# Patient Record
Sex: MALE | Race: BLACK OR AFRICAN AMERICAN | NOT HISPANIC OR LATINO | URBAN - METROPOLITAN AREA
[De-identification: names, ages, dates, MRNs, and addresses within clinical notes are randomized per-mention and may not be internally consistent; named-entity substitution may affect disease eponyms.]

---

## 2017-07-29 ENCOUNTER — EMERGENCY (EMERGENCY)
Facility: HOSPITAL | Age: 52
LOS: 1 days | Discharge: HOME | End: 2017-07-29
Attending: EMERGENCY MEDICINE | Admitting: EMERGENCY MEDICINE
Payer: COMMERCIAL

## 2017-07-29 VITALS
SYSTOLIC BLOOD PRESSURE: 143 MMHG | HEIGHT: 70 IN | HEART RATE: 96 BPM | OXYGEN SATURATION: 100 % | TEMPERATURE: 208 F | WEIGHT: 229.94 LBS | DIASTOLIC BLOOD PRESSURE: 90 MMHG | RESPIRATION RATE: 20 BRPM

## 2017-07-29 PROCEDURE — 99284 EMERGENCY DEPT VISIT MOD MDM: CPT | Mod: 25

## 2017-07-29 PROCEDURE — 99053 MED SERV 10PM-8AM 24 HR FAC: CPT
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
## 2017-07-30 PROCEDURE — 96372 THER/PROPH/DIAG INJ SC/IM: CPT

## 2017-07-30 PROCEDURE — 72125 CT NECK SPINE W/O DYE: CPT | Mod: 26

## 2017-07-30 PROCEDURE — 99284 EMERGENCY DEPT VISIT MOD MDM: CPT | Mod: 25

## 2017-07-30 PROCEDURE — 73030 X-RAY EXAM OF SHOULDER: CPT | Mod: 26,RT

## 2017-07-30 PROCEDURE — 73030 X-RAY EXAM OF SHOULDER: CPT

## 2017-07-30 PROCEDURE — 70450 CT HEAD/BRAIN W/O DYE: CPT | Mod: 26

## 2017-07-30 PROCEDURE — 70450 CT HEAD/BRAIN W/O DYE: CPT

## 2017-07-30 PROCEDURE — 72125 CT NECK SPINE W/O DYE: CPT

## 2017-07-30 RX ORDER — TAMSULOSIN HYDROCHLORIDE 0.4 MG/1
0 CAPSULE ORAL
Qty: 0 | Refills: 0 | COMMUNITY

## 2017-07-30 RX ORDER — AMLODIPINE BESYLATE 2.5 MG/1
0 TABLET ORAL
Qty: 0 | Refills: 0 | COMMUNITY

## 2017-07-30 RX ORDER — IBUPROFEN 200 MG
1 TABLET ORAL
Qty: 9 | Refills: 0 | OUTPATIENT
Start: 2017-07-30 | End: 2017-08-02

## 2017-07-30 RX ORDER — LEVOTHYROXINE SODIUM 125 MCG
0 TABLET ORAL
Qty: 0 | Refills: 0 | COMMUNITY

## 2017-07-30 RX ORDER — CYCLOBENZAPRINE HYDROCHLORIDE 10 MG/1
10 TABLET, FILM COATED ORAL ONCE
Qty: 0 | Refills: 0 | Status: COMPLETED | OUTPATIENT
Start: 2017-07-30 | End: 2017-07-30

## 2017-07-30 RX ORDER — KETOROLAC TROMETHAMINE 30 MG/ML
60 SYRINGE (ML) INJECTION ONCE
Qty: 0 | Refills: 0 | Status: DISCONTINUED | OUTPATIENT
Start: 2017-07-30 | End: 2017-07-30

## 2017-07-30 RX ADMIN — Medication 60 MILLIGRAM(S): at 03:23

## 2017-07-30 RX ADMIN — CYCLOBENZAPRINE HYDROCHLORIDE 10 MILLIGRAM(S): 10 TABLET, FILM COATED ORAL at 03:23
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
## 2017-07-30 NOTE — ED PROVIDER NOTE - PHYSICAL EXAMINATION
sitting comfortably, talking in full sentences  pupilsa reactive, eomi,   mm moist, no oral injury, no facial pain  supple, no c/t/l spine tenderness, from, trachea in midline  Ruben chest expansion, no cheat wall tenderness, no rib tenderness, no deformity, no clavicular pain  S1 S2 distant  abd soft, non tender, no g/r,   no pelvic pain  moves all ext, no swelling noted  Neuro aao3, cn intact grossly, no focal deficits  skin no bruises, no swelling
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
## 2017-07-30 NOTE — ED PROVIDER NOTE - NS ED ROS FT
no weight change, no fever or chills  no rash, no bruises  no visual changes no discharge  no cough cold or congestion,   no sob, no chest pain  no orthopnea, no pnd  no abd pain, no n/v/d  no hematuria, no change in urinary habits  +headache, no paresthesia  +back pain, +neck pain  no previous psych evaluation
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
## 2018-02-12 ENCOUNTER — APPOINTMENT (OUTPATIENT)
Dept: MRI IMAGING | Facility: CLINIC | Age: 53
End: 2018-02-12
Payer: MEDICARE

## 2018-02-12 ENCOUNTER — OUTPATIENT (OUTPATIENT)
Dept: OUTPATIENT SERVICES | Facility: HOSPITAL | Age: 53
LOS: 1 days | End: 2018-02-12

## 2018-02-12 DIAGNOSIS — Z00.8 ENCOUNTER FOR OTHER GENERAL EXAMINATION: ICD-10-CM

## 2018-02-12 PROCEDURE — 73721 MRI JNT OF LWR EXTRE W/O DYE: CPT | Mod: 26,LT

## 2018-05-18 ENCOUNTER — APPOINTMENT (OUTPATIENT)
Dept: GASTROENTEROLOGY | Facility: CLINIC | Age: 53
End: 2018-05-18
Payer: MEDICARE

## 2018-05-18 VITALS
BODY MASS INDEX: 32.64 KG/M2 | RESPIRATION RATE: 14 BRPM | HEART RATE: 72 BPM | SYSTOLIC BLOOD PRESSURE: 131 MMHG | DIASTOLIC BLOOD PRESSURE: 93 MMHG | HEIGHT: 70 IN | WEIGHT: 228 LBS

## 2018-05-18 PROCEDURE — 99203 OFFICE O/P NEW LOW 30 MIN: CPT

## 2018-05-18 RX ORDER — AMLODIPINE BESYLATE AND BENAZEPRIL HYDROCHLORIDE 5; 10 MG/1; MG/1
5-10 CAPSULE ORAL
Refills: 0 | Status: DISCONTINUED | COMMUNITY

## 2018-05-18 RX ORDER — FLUTICASONE PROPIONATE AND SALMETEROL 50; 100 UG/1; UG/1
100-50 POWDER RESPIRATORY (INHALATION)
Refills: 0 | Status: DISCONTINUED | COMMUNITY

## 2018-05-18 RX ORDER — CARISOPRODOL 350 MG/1
350 TABLET ORAL
Refills: 0 | Status: DISCONTINUED | COMMUNITY

## 2018-08-02 PROBLEM — I10 ESSENTIAL (PRIMARY) HYPERTENSION: Chronic | Status: ACTIVE | Noted: 2017-08-09

## 2018-08-02 PROBLEM — N40.0 BENIGN PROSTATIC HYPERPLASIA WITHOUT LOWER URINARY TRACT SYMPTOMS: Chronic | Status: ACTIVE | Noted: 2017-07-30

## 2018-08-02 PROBLEM — H40.9 UNSPECIFIED GLAUCOMA: Chronic | Status: ACTIVE | Noted: 2017-07-30

## 2018-08-13 ENCOUNTER — OUTPATIENT (OUTPATIENT)
Dept: OUTPATIENT SERVICES | Facility: HOSPITAL | Age: 53
LOS: 1 days | End: 2018-08-13
Payer: MEDICARE

## 2018-08-13 ENCOUNTER — APPOINTMENT (OUTPATIENT)
Dept: GASTROENTEROLOGY | Facility: GI CENTER | Age: 53
End: 2018-08-13
Payer: MEDICARE

## 2018-08-13 ENCOUNTER — RESULT REVIEW (OUTPATIENT)
Age: 53
End: 2018-08-13

## 2018-08-13 DIAGNOSIS — K64.8 OTHER HEMORRHOIDS: ICD-10-CM

## 2018-08-13 DIAGNOSIS — Z86.010 PERSONAL HISTORY OF COLONIC POLYPS: ICD-10-CM

## 2018-08-13 DIAGNOSIS — K62.1 RECTAL POLYP: ICD-10-CM

## 2018-08-13 PROCEDURE — 45380 COLONOSCOPY AND BIOPSY: CPT

## 2018-08-13 PROCEDURE — 88305 TISSUE EXAM BY PATHOLOGIST: CPT | Mod: 26

## 2018-08-13 PROCEDURE — 45380 COLONOSCOPY AND BIOPSY: CPT | Mod: PT

## 2018-08-13 PROCEDURE — 88305 TISSUE EXAM BY PATHOLOGIST: CPT

## 2018-08-17 LAB — SURGICAL PATHOLOGY FINAL REPORT - CH: SIGNIFICANT CHANGE UP

## 2019-06-11 ENCOUNTER — TRANSCRIPTION ENCOUNTER (OUTPATIENT)
Age: 54
End: 2019-06-11

## 2019-06-19 ENCOUNTER — APPOINTMENT (OUTPATIENT)
Dept: FAMILY MEDICINE | Facility: CLINIC | Age: 54
End: 2019-06-19
Payer: MEDICARE

## 2019-06-19 VITALS
SYSTOLIC BLOOD PRESSURE: 125 MMHG | WEIGHT: 232 LBS | BODY MASS INDEX: 33.21 KG/M2 | HEART RATE: 76 BPM | DIASTOLIC BLOOD PRESSURE: 80 MMHG | RESPIRATION RATE: 12 BRPM | OXYGEN SATURATION: 98 % | HEIGHT: 70 IN | TEMPERATURE: 98.2 F

## 2019-06-19 PROCEDURE — 99213 OFFICE O/P EST LOW 20 MIN: CPT

## 2019-06-19 NOTE — PHYSICAL EXAM
[No Acute Distress] : no acute distress [Well Nourished] : well nourished [Well-Appearing] : well-appearing [Well Developed] : well developed [PERRL] : pupils equal round and reactive to light [Normal Sclera/Conjunctiva] : normal sclera/conjunctiva [Normal Outer Ear/Nose] : the outer ears and nose were normal in appearance [EOMI] : extraocular movements intact [Supple] : supple [Normal Oropharynx] : the oropharynx was normal [No JVD] : no jugular venous distention [No Lymphadenopathy] : no lymphadenopathy [Thyroid Normal, No Nodules] : the thyroid was normal and there were no nodules present [No Respiratory Distress] : no respiratory distress  [Clear to Auscultation] : lungs were clear to auscultation bilaterally [No Accessory Muscle Use] : no accessory muscle use [Normal S1, S2] : normal S1 and S2 [Normal Rate] : normal rate  [Regular Rhythm] : with a regular rhythm [No Murmur] : no murmur heard [No Carotid Bruits] : no carotid bruits [No Abdominal Bruit] : a ~M bruit was not heard ~T in the abdomen [Pedal Pulses Present] : the pedal pulses are present [No Varicosities] : no varicosities [No Extremity Clubbing/Cyanosis] : no extremity clubbing/cyanosis [No Edema] : there was no peripheral edema [No Palpable Aorta] : no palpable aorta [Non Tender] : non-tender [Soft] : abdomen soft [Non-distended] : non-distended [No Masses] : no abdominal mass palpated [No HSM] : no HSM [Normal Bowel Sounds] : normal bowel sounds [Normal Anterior Cervical Nodes] : no anterior cervical lymphadenopathy [Normal Posterior Cervical Nodes] : no posterior cervical lymphadenopathy [No CVA Tenderness] : no CVA  tenderness [No Spinal Tenderness] : no spinal tenderness [No Joint Swelling] : no joint swelling [Normal Gait] : normal gait [No Rash] : no rash [No Focal Deficits] : no focal deficits [Coordination Grossly Intact] : coordination grossly intact [Normal Affect] : the affect was normal [Deep Tendon Reflexes (DTR)] : deep tendon reflexes were 2+ and symmetric [Normal Insight/Judgement] : insight and judgment were intact [de-identified] : Pain with palpation of the left bicep tricep area
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
## 2019-07-03 ENCOUNTER — TRANSCRIPTION ENCOUNTER (OUTPATIENT)
Age: 54
End: 2019-07-03

## 2019-09-19 ENCOUNTER — APPOINTMENT (OUTPATIENT)
Dept: FAMILY MEDICINE | Facility: CLINIC | Age: 54
End: 2019-09-19
Payer: MEDICARE

## 2019-09-19 VITALS — DIASTOLIC BLOOD PRESSURE: 82 MMHG | SYSTOLIC BLOOD PRESSURE: 130 MMHG

## 2019-09-19 VITALS — HEART RATE: 77 BPM | RESPIRATION RATE: 98 BRPM | WEIGHT: 240.8 LBS | TEMPERATURE: 98.2 F | BODY MASS INDEX: 34.55 KG/M2

## 2019-09-19 PROCEDURE — 99213 OFFICE O/P EST LOW 20 MIN: CPT

## 2019-09-19 RX ORDER — SODIUM SULFATE, POTASSIUM SULFATE, MAGNESIUM SULFATE 17.5; 3.13; 1.6 G/ML; G/ML; G/ML
17.5-3.13-1.6 SOLUTION, CONCENTRATE ORAL
Qty: 1 | Refills: 0 | Status: DISCONTINUED | COMMUNITY
Start: 2018-05-18 | End: 2019-09-19

## 2019-09-19 NOTE — HISTORY OF PRESENT ILLNESS
[FreeTextEntry1] : checkup [de-identified] : 54-year-old male past medical history of hypertension asthma BPH and hypothyroid. Here for renewal of medication. No complaints

## 2019-09-19 NOTE — HEALTH RISK ASSESSMENT
[Patient reported colonoscopy was normal] : Patient reported colonoscopy was normal [With Significant Other] : lives with significant other [With Family] : lives with family [Retired] : retired [] :  [Feels Safe at Home] : Feels safe at home [No] : No [Reports changes in hearing] : Reports no changes in hearing [Reports changes in vision] : Reports no changes in vision [ColonoscopyDate] : 09/2018 [HepatitisCDate] : 12/16 [] : No

## 2019-09-19 NOTE — PHYSICAL EXAM
[Normal Sclera/Conjunctiva] : normal sclera/conjunctiva [Normal Outer Ear/Nose] : the outer ears and nose were normal in appearance [No Lymphadenopathy] : no lymphadenopathy [Supple] : supple [Normal] : normal rate, regular rhythm, normal S1 and S2 and no murmur heard [No Extremity Clubbing/Cyanosis] : no extremity clubbing/cyanosis

## 2019-11-19 ENCOUNTER — APPOINTMENT (OUTPATIENT)
Dept: FAMILY MEDICINE | Facility: CLINIC | Age: 54
End: 2019-11-19
Payer: MEDICARE

## 2019-11-19 VITALS
SYSTOLIC BLOOD PRESSURE: 145 MMHG | HEART RATE: 74 BPM | WEIGHT: 245 LBS | HEIGHT: 70 IN | TEMPERATURE: 98.7 F | DIASTOLIC BLOOD PRESSURE: 85 MMHG | OXYGEN SATURATION: 96 % | BODY MASS INDEX: 35.07 KG/M2 | RESPIRATION RATE: 10 BRPM

## 2019-11-19 PROCEDURE — 99213 OFFICE O/P EST LOW 20 MIN: CPT | Mod: 25

## 2019-11-19 PROCEDURE — 36415 COLL VENOUS BLD VENIPUNCTURE: CPT

## 2019-11-19 NOTE — HISTORY OF PRESENT ILLNESS
[FreeTextEntry1] : pt is here to complaint about a lump on his neck and a sharp pain on his head (temporal bone) [de-identified] : 53 YO M C/O SHARP SUDDEN STABBING PAIN LEFT TEMPLE, LASTS 1 SEC AND SUBSIDES NO AMOROSIS FUGAX, X 1 MO

## 2019-11-19 NOTE — PHYSICAL EXAM
[Normal Sclera/Conjunctiva] : normal sclera/conjunctiva [Normal Outer Ear/Nose] : the outer ears and nose were normal in appearance [No Lymphadenopathy] : no lymphadenopathy [Normal] : no respiratory distress, lungs were clear to auscultation bilaterally and no accessory muscle use [No Focal Deficits] : no focal deficits [de-identified] : 2-12 GROSSLY INTACT

## 2019-11-22 LAB
ANION GAP SERPL CALC-SCNC: 13 MMOL/L
BASOPHILS # BLD AUTO: 0.06 K/UL
BASOPHILS NFR BLD AUTO: 1.2 %
BUN SERPL-MCNC: 24 MG/DL
CALCIUM SERPL-MCNC: 9.8 MG/DL
CHLORIDE SERPL-SCNC: 103 MMOL/L
CO2 SERPL-SCNC: 25 MMOL/L
CREAT SERPL-MCNC: 1.32 MG/DL
EOSINOPHIL # BLD AUTO: 0.04 K/UL
EOSINOPHIL NFR BLD AUTO: 0.8 %
ERYTHROCYTE [SEDIMENTATION RATE] IN BLOOD BY WESTERGREN METHOD: 25 MM/HR
GLUCOSE SERPL-MCNC: 122 MG/DL
HCT VFR BLD CALC: 36.2 %
HGB BLD-MCNC: 11.7 G/DL
IMM GRANULOCYTES NFR BLD AUTO: 0.4 %
LYMPHOCYTES # BLD AUTO: 1.46 K/UL
LYMPHOCYTES NFR BLD AUTO: 28.7 %
MAN DIFF?: NORMAL
MCHC RBC-ENTMCNC: 31.7 PG
MCHC RBC-ENTMCNC: 32.3 GM/DL
MCV RBC AUTO: 98.1 FL
MONOCYTES # BLD AUTO: 0.45 K/UL
MONOCYTES NFR BLD AUTO: 8.8 %
NEUTROPHILS # BLD AUTO: 3.06 K/UL
NEUTROPHILS NFR BLD AUTO: 60.1 %
PLATELET # BLD AUTO: 250 K/UL
POTASSIUM SERPL-SCNC: 4.6 MMOL/L
RBC # BLD: 3.69 M/UL
RBC # FLD: 11.3 %
SODIUM SERPL-SCNC: 141 MMOL/L
WBC # FLD AUTO: 5.09 K/UL

## 2019-12-04 ENCOUNTER — APPOINTMENT (OUTPATIENT)
Dept: FAMILY MEDICINE | Facility: CLINIC | Age: 54
End: 2019-12-04
Payer: MEDICARE

## 2019-12-04 VITALS
OXYGEN SATURATION: 96 % | TEMPERATURE: 98 F | DIASTOLIC BLOOD PRESSURE: 89 MMHG | SYSTOLIC BLOOD PRESSURE: 120 MMHG | HEART RATE: 80 BPM

## 2019-12-04 PROCEDURE — 99213 OFFICE O/P EST LOW 20 MIN: CPT

## 2019-12-04 NOTE — PLAN
[FreeTextEntry1] : Patient has naproxen at home will take b.i.d., Flexeril 3 times a day as tolerated. X-ray will be ordered. If symptoms of trigeminal neuralgia persists neurologist will be recommended

## 2019-12-04 NOTE — HISTORY OF PRESENT ILLNESS
[FreeTextEntry1] : pt. is here for neck pain  [de-identified] : 54-year-old male still complaining of intermittent neuralgia pain left side of head. Today complains of severe neck pain on his right index survey did with any movement taking naproxen with no relief

## 2019-12-04 NOTE — PHYSICAL EXAM
[No Lymphadenopathy] : no lymphadenopathy [Normal] : no respiratory distress, lungs were clear to auscultation bilaterally and no accessory muscle use [de-identified] : Decreased range of motion cervical spine. Tenderness to palpation right paravertebral and traps

## 2019-12-11 ENCOUNTER — APPOINTMENT (OUTPATIENT)
Dept: FAMILY MEDICINE | Facility: CLINIC | Age: 54
End: 2019-12-11
Payer: MEDICARE

## 2019-12-11 ENCOUNTER — APPOINTMENT (OUTPATIENT)
Dept: FAMILY MEDICINE | Facility: CLINIC | Age: 54
End: 2019-12-11

## 2019-12-11 VITALS
WEIGHT: 250 LBS | DIASTOLIC BLOOD PRESSURE: 84 MMHG | HEART RATE: 86 BPM | SYSTOLIC BLOOD PRESSURE: 122 MMHG | BODY MASS INDEX: 35.87 KG/M2 | TEMPERATURE: 98.4 F | OXYGEN SATURATION: 98 %

## 2019-12-11 PROCEDURE — 99213 OFFICE O/P EST LOW 20 MIN: CPT

## 2019-12-11 NOTE — PLAN
[FreeTextEntry1] : X-ray reveals arthritis mild retrolisthesis , MRI will be ordered. Continue NSAID and muscle relaxer, and commence physical therapy

## 2019-12-11 NOTE — PHYSICAL EXAM
[Normal] : no respiratory distress, lungs were clear to auscultation bilaterally and no accessory muscle use [de-identified] : Decreased range of motion cervical spine, pain palpation right cervical paravertebral, and cervical spine [de-identified] : Decreased range of motion cervical spine, pain palpation right cervical paravertebral, and cervical spine,strength is 3/5 both

## 2019-12-11 NOTE — HISTORY OF PRESENT ILLNESS
[FreeTextEntry1] : pt here to f/u from last visit and to review x rays  [de-identified] : Still complaining of severe neck pain right side, still experiencing sharp shooting pains on the left side of head described as electric shock, listing a second then subsiding, but it is constant and all day.  \par No relief with medications\par X-ray results are still pending\par

## 2019-12-11 NOTE — ASSESSMENT
[FreeTextEntry1] : 54-year-old male suffering from tic douloureux, neuro appointment is pending. Cervical spine MRI will be ordered. Cervical spine x-ray reveals a mild retrolisthesis C3-C4, along with arthritis

## 2020-01-28 ENCOUNTER — RX RENEWAL (OUTPATIENT)
Age: 55
End: 2020-01-28

## 2020-02-25 ENCOUNTER — APPOINTMENT (OUTPATIENT)
Dept: FAMILY MEDICINE | Facility: CLINIC | Age: 55
End: 2020-02-25
Payer: MEDICARE

## 2020-02-25 VITALS
SYSTOLIC BLOOD PRESSURE: 124 MMHG | WEIGHT: 241.5 LBS | OXYGEN SATURATION: 97 % | BODY MASS INDEX: 34.65 KG/M2 | HEART RATE: 91 BPM | TEMPERATURE: 98.2 F | DIASTOLIC BLOOD PRESSURE: 84 MMHG

## 2020-02-25 PROCEDURE — 99213 OFFICE O/P EST LOW 20 MIN: CPT | Mod: 25

## 2020-02-25 PROCEDURE — 36415 COLL VENOUS BLD VENIPUNCTURE: CPT

## 2020-02-25 NOTE — HISTORY OF PRESENT ILLNESS
[FreeTextEntry1] : Pt is here for check up, medication was already filled for 90 days prior to this visit.  [de-identified] : 54-year-old male here for a 3 month visit, medications were already renewed. No complaints today.  was seen by cardiology, on February 24, 2020. Stress test was done all negative as per patient

## 2020-02-25 NOTE — PHYSICAL EXAM
[Normal Sclera/Conjunctiva] : normal sclera/conjunctiva [Normal Outer Ear/Nose] : the outer ears and nose were normal in appearance [No Lymphadenopathy] : no lymphadenopathy [Normal] : normal rate, regular rhythm, normal S1 and S2 and no murmur heard [No Extremity Clubbing/Cyanosis] : no extremity clubbing/cyanosis [No Focal Deficits] : no focal deficits

## 2020-05-26 ENCOUNTER — APPOINTMENT (OUTPATIENT)
Dept: FAMILY MEDICINE | Facility: CLINIC | Age: 55
End: 2020-05-26

## 2020-06-11 ENCOUNTER — TRANSCRIPTION ENCOUNTER (OUTPATIENT)
Age: 55
End: 2020-06-11

## 2020-08-22 ENCOUNTER — APPOINTMENT (OUTPATIENT)
Dept: FAMILY MEDICINE | Facility: CLINIC | Age: 55
End: 2020-08-22
Payer: MEDICARE

## 2020-08-22 VITALS — HEART RATE: 75 BPM | BODY MASS INDEX: 34.58 KG/M2 | TEMPERATURE: 98.7 F | WEIGHT: 241 LBS | OXYGEN SATURATION: 97 %

## 2020-08-22 VITALS — DIASTOLIC BLOOD PRESSURE: 80 MMHG | SYSTOLIC BLOOD PRESSURE: 136 MMHG

## 2020-08-22 VITALS — WEIGHT: 230.6 LBS | BODY MASS INDEX: 33.09 KG/M2

## 2020-08-22 DIAGNOSIS — M79.603 PAIN IN ARM, UNSPECIFIED: ICD-10-CM

## 2020-08-22 PROCEDURE — 99213 OFFICE O/P EST LOW 20 MIN: CPT

## 2020-08-22 NOTE — PHYSICAL EXAM
[Normal Sclera/Conjunctiva] : normal sclera/conjunctiva [Normal Outer Ear/Nose] : the outer ears and nose were normal in appearance [Normal] : affect was normal and insight and judgment were intact [No Focal Deficits] : no focal deficits [No Extremity Clubbing/Cyanosis] : no extremity clubbing/cyanosis [de-identified] : left-sided orthotic on his left foot

## 2020-08-22 NOTE — HISTORY OF PRESENT ILLNESS
[FreeTextEntry1] : pt. here for refill [de-identified] : 54-year-old male patient long history of working with the [employer], NOW RETIRED 12 YEARS\par patient is currently in a foot braceleft foot. Saw podiatry August 21, 2020, MRI will be gotten\par complains of chronic painin his knees, lumbar spine,bilateral hands which he attributes to hisjob description

## 2020-11-11 ENCOUNTER — NON-APPOINTMENT (OUTPATIENT)
Age: 55
End: 2020-11-11

## 2020-11-11 ENCOUNTER — APPOINTMENT (OUTPATIENT)
Dept: FAMILY MEDICINE | Facility: CLINIC | Age: 55
End: 2020-11-11
Payer: MEDICARE

## 2020-11-11 VITALS
SYSTOLIC BLOOD PRESSURE: 120 MMHG | DIASTOLIC BLOOD PRESSURE: 89 MMHG | WEIGHT: 236.8 LBS | OXYGEN SATURATION: 96 % | BODY MASS INDEX: 33.98 KG/M2 | HEART RATE: 74 BPM | TEMPERATURE: 98.6 F

## 2020-11-11 DIAGNOSIS — R53.83 OTHER FATIGUE: ICD-10-CM

## 2020-11-11 DIAGNOSIS — R55 SYNCOPE AND COLLAPSE: ICD-10-CM

## 2020-11-11 PROCEDURE — 99213 OFFICE O/P EST LOW 20 MIN: CPT | Mod: 25

## 2020-11-11 PROCEDURE — 93000 ELECTROCARDIOGRAM COMPLETE: CPT

## 2020-11-11 PROCEDURE — 36415 COLL VENOUS BLD VENIPUNCTURE: CPT

## 2020-11-11 PROCEDURE — 81003 URINALYSIS AUTO W/O SCOPE: CPT | Mod: QW

## 2020-11-11 PROCEDURE — 82962 GLUCOSE BLOOD TEST: CPT

## 2020-11-11 NOTE — PHYSICAL EXAM
[Normal Sclera/Conjunctiva] : normal sclera/conjunctiva [Normal Outer Ear/Nose] : the outer ears and nose were normal in appearance [No Lymphadenopathy] : no lymphadenopathy [No Carotid Bruits] : no carotid bruits [No Extremity Clubbing/Cyanosis] : no extremity clubbing/cyanosis [No Rash] : no rash [No Focal Deficits] : no focal deficits [Normal] : affect was normal and insight and judgment were intact [de-identified] : c [de-identified] : appreciated

## 2020-11-11 NOTE — PLAN
[FreeTextEntry1] : Patient is experiencing no complaints today,  patient aware that if this episode happens again he must go to the hospital\par He will be making a followup visit with his cardiologist

## 2020-11-11 NOTE — ASSESSMENT
[FreeTextEntry1] : Syncopal episode, EKG reveals a first degree AV block, patient will followup with his cardiologist ASAP\par Copy of EKG was given to patient
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
## 2020-11-12 LAB
ALBUMIN SERPL ELPH-MCNC: 4.5 G/DL
ALP BLD-CCNC: 54 U/L
ALT SERPL-CCNC: 12 U/L
ANION GAP SERPL CALC-SCNC: 13 MMOL/L
AST SERPL-CCNC: 13 U/L
BASOPHILS # BLD AUTO: 0.07 K/UL
BASOPHILS NFR BLD AUTO: 1.1 %
BILIRUB SERPL-MCNC: 0.3 MG/DL
BILIRUB UR QL STRIP: NORMAL
BUN SERPL-MCNC: 19 MG/DL
CALCIUM SERPL-MCNC: 9.7 MG/DL
CHLORIDE SERPL-SCNC: 102 MMOL/L
CO2 SERPL-SCNC: 27 MMOL/L
CREAT SERPL-MCNC: 1.48 MG/DL
EOSINOPHIL # BLD AUTO: 0.28 K/UL
EOSINOPHIL NFR BLD AUTO: 4.4 %
ESTIMATED AVERAGE GLUCOSE: 103 MG/DL
FERRITIN SERPL-MCNC: 155 NG/ML
GLUCOSE BLDC GLUCOMTR-MCNC: 91
GLUCOSE SERPL-MCNC: 92 MG/DL
GLUCOSE UR-MCNC: NORMAL
HBA1C MFR BLD HPLC: 5.2 %
HCG UR QL: 0.2 EU/DL
HCT VFR BLD CALC: 39.5 %
HGB BLD-MCNC: 12.3 G/DL
HGB UR QL STRIP.AUTO: NORMAL
IMM GRANULOCYTES NFR BLD AUTO: 0.3 %
IRON SATN MFR SERPL: 25 %
IRON SERPL-MCNC: 77 UG/DL
KETONES UR-MCNC: NORMAL
LEUKOCYTE ESTERASE UR QL STRIP: NORMAL
LYMPHOCYTES # BLD AUTO: 2.76 K/UL
LYMPHOCYTES NFR BLD AUTO: 43.8 %
MAN DIFF?: NORMAL
MCHC RBC-ENTMCNC: 31.1 GM/DL
MCHC RBC-ENTMCNC: 31.8 PG
MCV RBC AUTO: 102.1 FL
MONOCYTES # BLD AUTO: 0.65 K/UL
MONOCYTES NFR BLD AUTO: 10.3 %
NEUTROPHILS # BLD AUTO: 2.52 K/UL
NEUTROPHILS NFR BLD AUTO: 40.1 %
NITRITE UR QL STRIP: NORMAL
PH UR STRIP: 7
PLATELET # BLD AUTO: 251 K/UL
POTASSIUM SERPL-SCNC: 4.7 MMOL/L
PROT SERPL-MCNC: 7.3 G/DL
PROT UR STRIP-MCNC: NORMAL
RBC # BLD: 3.87 M/UL
RBC # FLD: 11.7 %
SODIUM SERPL-SCNC: 142 MMOL/L
SP GR UR STRIP: 1.02
T4 FREE SERPL-MCNC: 1.1 NG/DL
TIBC SERPL-MCNC: 314 UG/DL
TRANSFERRIN SERPL-MCNC: 260 MG/DL
TSH SERPL-ACNC: 0.9 UIU/ML
UIBC SERPL-MCNC: 236 UG/DL
WBC # FLD AUTO: 6.3 K/UL

## 2020-11-14 ENCOUNTER — NON-APPOINTMENT (OUTPATIENT)
Age: 55
End: 2020-11-14

## 2021-02-10 ENCOUNTER — NON-APPOINTMENT (OUTPATIENT)
Age: 56
End: 2021-02-10

## 2021-02-11 ENCOUNTER — APPOINTMENT (OUTPATIENT)
Dept: FAMILY MEDICINE | Facility: CLINIC | Age: 56
End: 2021-02-11
Payer: MEDICARE

## 2021-02-11 VITALS
WEIGHT: 237 LBS | RESPIRATION RATE: 14 BRPM | TEMPERATURE: 97.2 F | DIASTOLIC BLOOD PRESSURE: 80 MMHG | HEART RATE: 98 BPM | SYSTOLIC BLOOD PRESSURE: 130 MMHG | OXYGEN SATURATION: 98 % | BODY MASS INDEX: 34.01 KG/M2

## 2021-02-11 PROCEDURE — 99212 OFFICE O/P EST SF 10 MIN: CPT

## 2021-02-11 NOTE — PHYSICAL EXAM
[Normal Sclera/Conjunctiva] : normal sclera/conjunctiva [Normal Outer Ear/Nose] : the outer ears and nose were normal in appearance [No Lymphadenopathy] : no lymphadenopathy [No Extremity Clubbing/Cyanosis] : no extremity clubbing/cyanosis [No Rash] : no rash [No Focal Deficits] : no focal deficits [Normal] : affect was normal and insight and judgment were intact [de-identified] : c [de-identified] : STS MEDIAL RT KNEE, WITH TENDERNESS TO PALP

## 2021-02-11 NOTE — HISTORY OF PRESENT ILLNESS
[FreeTextEntry1] : med renewals [de-identified] : c/o cough, congestion. covid neg jan 29th, 2021, neg as per pt\par REQUESTING ATB ALSO COMPLAINING OF RIGHT KNEE PAIN.  WORSE WHILE STANDING.  NO TRAUMA.  FOLLOWING UP WITH NEPHROLOGY.

## 2021-02-11 NOTE — REVIEW OF SYSTEMS
[Postnasal Drip] : postnasal drip [Nasal Discharge] : nasal discharge [Cough] : cough [Joint Pain] : joint pain [Joint Stiffness] : joint stiffness [Negative] : Heme/Lymph [FreeTextEntry9] : rt. knee

## 2021-05-19 ENCOUNTER — RX RENEWAL (OUTPATIENT)
Age: 56
End: 2021-05-19

## 2021-05-19 ENCOUNTER — APPOINTMENT (OUTPATIENT)
Dept: FAMILY MEDICINE | Facility: CLINIC | Age: 56
End: 2021-05-19
Payer: MEDICARE

## 2021-05-19 VITALS
DIASTOLIC BLOOD PRESSURE: 90 MMHG | HEART RATE: 73 BPM | BODY MASS INDEX: 33 KG/M2 | RESPIRATION RATE: 12 BRPM | WEIGHT: 230 LBS | TEMPERATURE: 98.4 F | OXYGEN SATURATION: 96 % | SYSTOLIC BLOOD PRESSURE: 126 MMHG

## 2021-05-19 PROCEDURE — 99214 OFFICE O/P EST MOD 30 MIN: CPT

## 2021-05-19 RX ORDER — AZITHROMYCIN 250 MG/1
250 TABLET, FILM COATED ORAL
Qty: 1 | Refills: 0 | Status: DISCONTINUED | COMMUNITY
Start: 2021-02-11 | End: 2021-05-19

## 2021-05-19 RX ORDER — NAPROXEN SODIUM 550 MG/1
550 TABLET ORAL
Qty: 60 | Refills: 1 | Status: DISCONTINUED | COMMUNITY
Start: 2019-11-19 | End: 2021-05-19

## 2021-05-19 NOTE — HISTORY OF PRESENT ILLNESS
[FreeTextEntry1] : Pt is here for BP check and medication renewal. [de-identified] : We'll is a 55-year-old male patient past medical history of hypertension, back pain, rhinitis, hypothyroid, asthma, BPH. Here for his three-month check. Received both back C. and for the current pandemic covidPatient's blood pressure is high today.\par Ran out amlodipine x2 days no headaches no chest pain

## 2021-06-11 ENCOUNTER — APPOINTMENT (OUTPATIENT)
Dept: OTOLARYNGOLOGY | Facility: CLINIC | Age: 56
End: 2021-06-11
Payer: MEDICARE

## 2021-06-11 VITALS
HEIGHT: 70 IN | BODY MASS INDEX: 32.93 KG/M2 | SYSTOLIC BLOOD PRESSURE: 134 MMHG | HEART RATE: 78 BPM | DIASTOLIC BLOOD PRESSURE: 86 MMHG | WEIGHT: 230 LBS

## 2021-06-11 DIAGNOSIS — Z78.9 OTHER SPECIFIED HEALTH STATUS: ICD-10-CM

## 2021-06-11 DIAGNOSIS — G50.0 TRIGEMINAL NEURALGIA: ICD-10-CM

## 2021-06-11 PROCEDURE — 99204 OFFICE O/P NEW MOD 45 MIN: CPT | Mod: 25

## 2021-06-11 PROCEDURE — 31575 DIAGNOSTIC LARYNGOSCOPY: CPT

## 2021-06-11 RX ORDER — PROMETHAZINE HYDROCHLORIDE AND PHENYLEPHRINE HYDROCHLORIDE 6.25; 5 MG/5ML; MG/5ML
6.25-5 SYRUP ORAL
Qty: 1 | Refills: 0 | Status: DISCONTINUED | COMMUNITY
Start: 2021-02-11 | End: 2021-06-11

## 2021-06-11 NOTE — PHYSICAL EXAM
[Midline] : trachea located in midline position [Laryngoscopy Performed] : laryngoscopy was performed, see procedure section for findings [Normal] : no rashes [FreeTextEntry1] : obese, daytime hypersomnolence [de-identified] : swollen [de-identified] : onger class for soft palate uvula touches epiglottis

## 2021-06-11 NOTE — HISTORY OF PRESENT ILLNESS
[de-identified] : 55 year old male referred by Dr. Raoul Carballo, ENT, for obstructive sleep apnea.  Patient had a sleep study conducted over 2 years ago and would require an up to date.sleep study.  .Patient has a BMI of 33.0.  Patient has tried a CPAP machine without relief.  Patient complains of removing mask during sleep.  Patient has tried various types of masks without relief.  Patient states mask causes skin irritation, difficulty breathing, dry mouth.  Patient has tried using a dental appliance without relief.  Patient sleeping partner complains about the noise of the CPAP machine, stating it keeps them awake at night.  Patient comorbidities include hypertension, daytime fatigue, difficulty driving long distances due to fatigue.

## 2021-06-11 NOTE — CONSULT LETTER
[Dear  ___] : Dear  [unfilled], [Consult Letter:] : I had the pleasure of evaluating your patient, [unfilled]. [Please see my note below.] : Please see my note below. [Consult Closing:] : Thank you very much for allowing me to participate in the care of this patient.  If you have any questions, please do not hesitate to contact me. [Sincerely,] : Sincerely, [FreeTextEntry3] : Greg Ochoa MD, SAHIL, FACS\par  Department Otolaryngology\par Director of [facility]\par Professor of Otolaryngology, \par [employer]\par

## 2021-06-11 NOTE — REASON FOR VISIT
[Initial Consultation] : an initial consultation for [FreeTextEntry2] : referred by Dr. Raoul Carballo, ENT, for obstructive sleep apnea

## 2021-06-11 NOTE — PROCEDURE
[Video Captured] : video captured and filed [Topical Lidocaine] : topical lidocaine [Oxymetazoline HCl] : oxymetazoline HCl [Flexible Endoscope] : examined with the flexible endoscope [Serial Number: ___] : Serial Number: [unfilled] [Image(s) Captured] : image(s) captured and filed [Unable to Cooperate with Mirror] : patient unable to cooperate with mirror [Obstruction] : acute airway obstruction evaluation [Complicated Symptoms] : complicated symptoms requiring more thorough examination than provided by mirror [Velopharynx ___ %] : the velopharynx was [unfilled]U% collapsed [Normal] : the false vocal folds were pink and regular, the ventricular sulcus was open, the true vocal folds were glistening white, tense and of equal length, mobility, and height [True Vocal Cords Paralysis] : no true vocal cord paralysis [True Vocal Cords Erythematous] : no true vocal cord edema [True Vocal Cords Goins's Nodules] : no true vocal cord nodules [Glottis Arytenoid Cartilages] : no arytenoid granulomas [Glottis Arytenoid Cartilages Erythema] : no arytenoid erythema [Arytenoid Edema ___ /4] : bilaterally were normal [Arytenoid Erythema ___ /4] : bilaterally were normal [de-identified] : Patient was placed in the examination chair in a sitting position. The nose was decongested with oxymetazoline nasal solution. The airway was anesthetized with 4% Xylocaine.  The back of the throat was anesthetized with Cetacaine. Direct flexible/rigid video endoscopy was performed. Findings revealed:\par patient has turbinate hypertrophy nasopharynx narrow stalk pinpoint with a positive Müller maneuver. Long class for soft palate uvula touches epiglottis larynx vocal cords epiglottis normal. Thick base of tongue [de-identified] : ALYSIA [FreeTextEntry3] : + Friedman [de-identified] : thick

## 2021-06-11 NOTE — REVIEW OF SYSTEMS
[As Noted in HPI] : as noted in HPI [Negative] : Heme/Lymph [Lightheadedness] : lightheadedness [Nasal Congestion] : nasal congestion [Nose Bleeds] : nose bleeds [Problem Snoring] : problem snoring [Sinus Pain] : sinus pain [Sinus Pressure] : sinus pressure [Snoring With Pauses] : snoring with pauses [Hoarseness] : hoarseness [Throat Clearing] : throat clearing [Throat Dryness] : throat dryness [Throat Itching] : throat itching [Shortness Of Breath] : shortness of breath [Wheezing] : wheezing [Cough] : cough

## 2021-08-13 ENCOUNTER — RX RENEWAL (OUTPATIENT)
Age: 56
End: 2021-08-13

## 2021-08-26 ENCOUNTER — TRANSCRIPTION ENCOUNTER (OUTPATIENT)
Age: 56
End: 2021-08-26

## 2021-09-01 ENCOUNTER — APPOINTMENT (OUTPATIENT)
Dept: PULMONOLOGY | Facility: CLINIC | Age: 56
End: 2021-09-01
Payer: MEDICARE

## 2021-09-01 VITALS
SYSTOLIC BLOOD PRESSURE: 130 MMHG | DIASTOLIC BLOOD PRESSURE: 84 MMHG | BODY MASS INDEX: 34.07 KG/M2 | TEMPERATURE: 97.2 F | HEIGHT: 70 IN | OXYGEN SATURATION: 98 % | HEART RATE: 74 BPM | WEIGHT: 238 LBS

## 2021-09-01 PROCEDURE — 99204 OFFICE O/P NEW MOD 45 MIN: CPT | Mod: GC

## 2021-09-01 NOTE — PHYSICAL EXAM
[No Acute Distress] : no acute distress [Well Nourished] : well nourished [No Deformities] : no deformities [Well Developed] : well developed [Normal Oropharynx] : normal oropharynx [II] : Mallampati Class: II [3+] : Right Tonsil: 3+ [Normal Appearance] : normal appearance [No Neck Mass] : no neck mass [Normal Rate/Rhythm] : normal rate/rhythm [Normal S1, S2] : normal s1, s2 [No Murmurs] : no murmurs [No Resp Distress] : no resp distress [No Acc Muscle Use] : no acc muscle use [Clear to Auscultation Bilaterally] : clear to auscultation bilaterally [No Abnormalities] : no abnormalities [Normal Gait] : normal gait [No Clubbing] : no clubbing [No Cyanosis] : no cyanosis [No Edema] : no edema [Normal Color/ Pigmentation] : normal color/ pigmentation [No Focal Deficits] : no focal deficits [Oriented x3] : oriented x3 [Normal Mood] : normal mood [Normal Affect] : normal affect

## 2021-09-22 ENCOUNTER — APPOINTMENT (OUTPATIENT)
Dept: FAMILY MEDICINE | Facility: CLINIC | Age: 56
End: 2021-09-22
Payer: MEDICARE

## 2021-09-22 VITALS
BODY MASS INDEX: 34.72 KG/M2 | HEART RATE: 70 BPM | WEIGHT: 242 LBS | RESPIRATION RATE: 14 BRPM | OXYGEN SATURATION: 98 % | TEMPERATURE: 97.5 F

## 2021-09-22 VITALS — DIASTOLIC BLOOD PRESSURE: 82 MMHG | SYSTOLIC BLOOD PRESSURE: 142 MMHG

## 2021-09-22 VITALS — SYSTOLIC BLOOD PRESSURE: 160 MMHG | DIASTOLIC BLOOD PRESSURE: 95 MMHG

## 2021-09-22 DIAGNOSIS — J45.909 UNSPECIFIED ASTHMA, UNCOMPLICATED: ICD-10-CM

## 2021-09-22 DIAGNOSIS — Z23 ENCOUNTER FOR IMMUNIZATION: ICD-10-CM

## 2021-09-22 DIAGNOSIS — H40.9 UNSPECIFIED GLAUCOMA: ICD-10-CM

## 2021-09-22 DIAGNOSIS — Z83.438 FAMILY HISTORY OF OTHER DISORDER OF LIPOPROTEIN METABOLISM AND OTHER LIPIDEMIA: ICD-10-CM

## 2021-09-22 DIAGNOSIS — Z83.3 FAMILY HISTORY OF DIABETES MELLITUS: ICD-10-CM

## 2021-09-22 DIAGNOSIS — J00 ACUTE NASOPHARYNGITIS [COMMON COLD]: ICD-10-CM

## 2021-09-22 PROCEDURE — 36415 COLL VENOUS BLD VENIPUNCTURE: CPT

## 2021-09-22 PROCEDURE — 90715 TDAP VACCINE 7 YRS/> IM: CPT | Mod: GY

## 2021-09-22 PROCEDURE — 90471 IMMUNIZATION ADMIN: CPT

## 2021-09-22 PROCEDURE — G0439: CPT

## 2021-09-22 RX ORDER — FLUTICASONE PROPIONATE AND SALMETEROL XINAFOATE 230; 21 UG/1; UG/1
AEROSOL, METERED RESPIRATORY (INHALATION)
Refills: 0 | Status: DISCONTINUED | COMMUNITY
End: 2021-09-22

## 2021-09-22 RX ORDER — HYDROCHLOROTHIAZIDE 12.5 MG/1
12.5 TABLET ORAL
Qty: 90 | Refills: 0 | Status: DISCONTINUED | COMMUNITY
Start: 2019-11-19 | End: 2021-09-22

## 2021-09-22 RX ORDER — TRAZODONE HYDROCHLORIDE 300 MG/1
TABLET ORAL
Refills: 0 | Status: DISCONTINUED | COMMUNITY
End: 2021-09-22

## 2021-09-22 RX ORDER — CYCLOBENZAPRINE HYDROCHLORIDE 10 MG/1
10 TABLET, FILM COATED ORAL 3 TIMES DAILY
Qty: 270 | Refills: 2 | Status: DISCONTINUED | COMMUNITY
Start: 2019-12-04 | End: 2021-09-22

## 2021-09-22 NOTE — HISTORY OF PRESENT ILLNESS
[FreeTextEntry1] : Pt is here for CPE. [de-identified] : 56y M w/ PMHx HTN, chronic back pain, rhinitis, hypothyroid, asthma, BPH, ALYSIA on CPAP, glaucoma, anemia, hypothyroidism, CKD, insomnia/depression, presenting for CPE.\par \par HTN: had a stress echo neg for ischemia. carotid us done wnl. 10d monitor done to r/o arrhythmia which was normal reportedly. currently taking amlodipine 5mg daily and losartan 50mg? (losartan he reports was prescribed by his nephro for BP and CKD but he does not remember the dose). BP at home 120/80 or 125/84. he takes both meds in the morning but he didn’t take them this morning bc he rushed to his visit. he drinks 2-4 x 8oz cups of coffee daily. he drinks teas as well. does not take regular use of nsaids. he does eat a lot of fast food. \par \par hypothyroidism: levothyroxine 50mcg\par \par allergies: levocetirizine 5mg \par \par asthma: monteleukast 10mg, advair 500/50 BID, ventolin\par \par insomnia/depression: trazodone 50mg PRN and bupropion 150mg every other day as prescribed by his psych doctor Dr. Yun\par \par glaucoma: latanoprost eye drops \par \par also takes baby aspirin, and oxycodone 5-325 PRN for work injuries: pain doctor is Dr. Kemp at [facility]. pain is elbows and knees. also on carisoprodol 350mg PRN as prescribed by his pain doctor.\par \par sleep apnea: using CPAP machine at home. \par \par BPH: also takes tamsulosin .4mg as prescribed by his urologist- does not remember the name\par \par CKD: he has a nephrologist  but cannot remember the name . recently prescribed losartan a couple of months ago.\par \par anemia: a little anemic from labs in 2019 and 2020.. his two sisters are anemic. does not take iron, never had transfusions in the past. denies blood in the stools or melena, hematuria, weight loss, night sweats, hx celiac disease, stomach ulcers. \par \par he also takes zinc, vitamin D (5000IU daily), and vitamin E

## 2021-09-22 NOTE — HEALTH RISK ASSESSMENT
[] : Yes [Yes] : Yes [2 - 4 times a month (2 pts)] : 2-4 times a month (2 points) [3 or 4 (1 pt)] : 3 or 4  (1 point) [Never (0 pts)] : Never (0 points) [No] : In the past 12 months have you used drugs other than those required for medical reasons? No [0] : 2) Feeling down, depressed, or hopeless: Not at all (0) [PHQ-2 Negative - No further assessment needed] : PHQ-2 Negative - No further assessment needed [Patient reported colonoscopy was abnormal] : Patient reported colonoscopy was abnormal [HIV Test offered] : HIV Test offered [Hepatitis C test offered] : Hepatitis C test offered [With Family] : lives with family [Employed] : employed [de-identified] : occasionally cigars for special occasions [Audit-CScore] : 3 [[id number]] : 0 [ColonoscopyDate] : 08/2018 [ColonoscopyComments] : rectal polyp-hyperplastic- 10cm small sessile. was told to repeat in 3 years, due now. Dr. 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

## 2021-09-22 NOTE — ASSESSMENT
[FreeTextEntry1] : Physical Exam:\par Constitutional: No acute distress, well appearing\par HEENT: Normocephalic, atraumatic\par Neck: supple\par Cardiac: S1S2, Regular rate and rhythm, No murmurs\par Pulmonary: + mild inspiratory wheezing\par Abdomen: Soft, non-tender, non-distended, no guarding, normal bowel sounds\par Vascular: No peripheral edema\par Neurology: Coordination grossly intact, no focal deficits\par Psychiatric: Alert and oriented x3, normal mood\par \par \par \par HCM:\par - f/u bloodwork drawn in office\par - flu- refused \par - TDAP- given today\par - colonoscopy 8/2018: rectal polyp-hyperplastic- 10cm small sessile. was told to repeat in 3 years, due now. Dr. Jovanny Jo - given referral today\par - prostate ca screening- will obtain\par - can c/w oxycodone 5-325 PRN as directed by his pain doctor\par - will obtain vitamin D level\par - advised against use of vitamin E unless directed by a phsyician\par \par HTN:\par BP elevated in office today\par currently taking amlodipine 5mg daily and losartan unknown dose.  \par reports having normal BP at home\par did not take both of his meds this morning\par - advised him to f/u in 1 month for BP check and advised strict compliance with his medications, irrespective of fasting\par - advised him to cut down on his fast food and coffee intake\par - If BP still elevated on follow up visit, will need med adjustment\par - have also advised him to let us know losartan dosage\par \par hypothyroidism:\par - wiil get TFTs today\par - c/w  levothyroxine 50mcg\par \par allergies: \par - c/w levocetirizine 5mg \par \par asthma: \par slight insp wheeze today, pt otherwise comfortable on exam. feeling well today\par VS wnl\par reports not taking his advair today\par - can c/w montelukast 10mg\par - c/w advair 500/50 BID, ventolin as directed by his pulm\par \par insomnia/depression: \par - c/w trazodone and bupropion as directed by his psych doctor Dr. Yun\par \par glaucoma: \par - cw/ latanoprost eye drops as directed by his optho\par \par sleep apnea:\par - c/w CPAP \par \par BPH:\par - c/w tamsulosin as directed by his urologist. \par \par CKD: \par - advised to provide us with nephrologist name as well as losartan dose\par - will get kidney function\par \par anemia: \par asymptomatic \par - will get repeat CBC and anemia workup\par \par \par

## 2021-09-24 ENCOUNTER — NON-APPOINTMENT (OUTPATIENT)
Age: 56
End: 2021-09-24

## 2021-09-24 LAB
25(OH)D3 SERPL-MCNC: 36.4 NG/ML
ALBUMIN SERPL ELPH-MCNC: 4.6 G/DL
ALP BLD-CCNC: 47 U/L
ALT SERPL-CCNC: 21 U/L
ANION GAP SERPL CALC-SCNC: 14 MMOL/L
AST SERPL-CCNC: 48 U/L
BASOPHILS # BLD AUTO: 0.08 K/UL
BASOPHILS NFR BLD AUTO: 1.4 %
BILIRUB SERPL-MCNC: 0.4 MG/DL
BUN SERPL-MCNC: 18 MG/DL
CALCIUM SERPL-MCNC: 9.8 MG/DL
CHLORIDE SERPL-SCNC: 97 MMOL/L
CHOLEST SERPL-MCNC: 162 MG/DL
CO2 SERPL-SCNC: 25 MMOL/L
CREAT SERPL-MCNC: 1.32 MG/DL
CREAT SPEC-SCNC: 262 MG/DL
CREAT SPEC-SCNC: 262 MG/DL
CREAT/PROT UR: 0 RATIO
EOSINOPHIL # BLD AUTO: 0.14 K/UL
EOSINOPHIL NFR BLD AUTO: 2.5 %
ESTIMATED AVERAGE GLUCOSE: 105 MG/DL
FERRITIN SERPL-MCNC: 144 NG/ML
FOLATE SERPL-MCNC: >20 NG/ML
GLUCOSE SERPL-MCNC: 117 MG/DL
HBA1C MFR BLD HPLC: 5.3 %
HCT VFR BLD CALC: 41.3 %
HDLC SERPL-MCNC: 56 MG/DL
HGB BLD-MCNC: 12.5 G/DL
HIV1+2 AB SPEC QL IA.RAPID: NONREACTIVE
IMM GRANULOCYTES NFR BLD AUTO: 0.4 %
IRON SATN MFR SERPL: 26 %
IRON SERPL-MCNC: 102 UG/DL
LDLC SERPL CALC-MCNC: 93 MG/DL
LYMPHOCYTES # BLD AUTO: 2.24 K/UL
LYMPHOCYTES NFR BLD AUTO: 39.4 %
MAN DIFF?: NORMAL
MCHC RBC-ENTMCNC: 30.3 GM/DL
MCHC RBC-ENTMCNC: 32.5 PG
MCV RBC AUTO: 107.3 FL
MICROALBUMIN 24H UR DL<=1MG/L-MCNC: <1.2 MG/DL
MICROALBUMIN/CREAT 24H UR-RTO: NORMAL MG/G
MONOCYTES # BLD AUTO: 0.52 K/UL
MONOCYTES NFR BLD AUTO: 9.1 %
NEUTROPHILS # BLD AUTO: 2.69 K/UL
NEUTROPHILS NFR BLD AUTO: 47.2 %
NONHDLC SERPL-MCNC: 106 MG/DL
PLATELET # BLD AUTO: 160 K/UL
POTASSIUM SERPL-SCNC: 5.3 MMOL/L
PROT SERPL-MCNC: 7.4 G/DL
PROT UR-MCNC: 12 MG/DL
PSA FREE FLD-MCNC: 12 %
PSA FREE SERPL-MCNC: 0.38 NG/ML
PSA SERPL-MCNC: 3.22 NG/ML
RBC # BLD: 3.85 M/UL
RBC # FLD: 12.3 %
SODIUM SERPL-SCNC: 137 MMOL/L
T4 FREE SERPL-MCNC: 1.2 NG/DL
TIBC SERPL-MCNC: 398 UG/DL
TRANSFERRIN SERPL-MCNC: 272 MG/DL
TRIGL SERPL-MCNC: 61 MG/DL
TSH SERPL-ACNC: 0.83 UIU/ML
UIBC SERPL-MCNC: 297 UG/DL
VIT B12 SERPL-MCNC: 686 PG/ML
WBC # FLD AUTO: 5.69 K/UL

## 2021-09-24 RX ORDER — LOSARTAN POTASSIUM 50 MG/1
50 TABLET, FILM COATED ORAL
Refills: 0 | Status: DISCONTINUED | COMMUNITY
Start: 2021-09-22 | End: 2021-09-24

## 2021-11-04 ENCOUNTER — APPOINTMENT (OUTPATIENT)
Dept: FAMILY MEDICINE | Facility: CLINIC | Age: 56
End: 2021-11-04

## 2021-11-10 ENCOUNTER — APPOINTMENT (OUTPATIENT)
Dept: FAMILY MEDICINE | Facility: CLINIC | Age: 56
End: 2021-11-10
Payer: MEDICARE

## 2021-11-10 VITALS
HEIGHT: 70 IN | WEIGHT: 242 LBS | BODY MASS INDEX: 34.65 KG/M2 | OXYGEN SATURATION: 98 % | HEART RATE: 67 BPM | TEMPERATURE: 97.3 F

## 2021-11-10 VITALS — DIASTOLIC BLOOD PRESSURE: 90 MMHG | SYSTOLIC BLOOD PRESSURE: 150 MMHG

## 2021-11-10 DIAGNOSIS — G47.00 INSOMNIA, UNSPECIFIED: ICD-10-CM

## 2021-11-10 PROCEDURE — 36415 COLL VENOUS BLD VENIPUNCTURE: CPT

## 2021-11-10 PROCEDURE — 99214 OFFICE O/P EST MOD 30 MIN: CPT | Mod: 25

## 2021-11-10 RX ORDER — LOSARTAN POTASSIUM 25 MG/1
25 TABLET, FILM COATED ORAL DAILY
Refills: 0 | Status: DISCONTINUED | COMMUNITY
Start: 2021-09-24 | End: 2021-11-10

## 2021-11-10 NOTE — ASSESSMENT
[FreeTextEntry1] : Physical Exam:\par Constitutional: No acute distress, well appearing\par HEENT: Normocephalic, atraumatic\par Neck: + thyromegaly\par Cardiac: S1S2, Regular rate and rhythm, No murmurs\par Pulmonary: No respiratory distress, Lungs clear to auscultation bilaterally, no wheezing, rales, or rhonchi\par Abdomen: Soft, non-tender, non-distended, no guarding, normal bowel sounds\par Vascular: No peripheral edema\par Neurology: Coordination grossly intact, no focal deficits\par Psychiatric: Alert and oriented x3, normal mood\par \par \par \par A/P:\par HTN:\par BP still elevated. also brought w/ him his home BP machine and it was checked for accuracy.\par - will increase losartan to 50\par - c/w amlo 5\par - advised to c/w limiting salt and caffeine\par - f/u 1 month for BP check\par \par hypothyroid: \par TFTs wnl\par - c/w levo 50\par \par insomnia/depression: \par - c/w trazodone 50 PRN and bupropion 150 every other day as prescribed by his psych doctor Dr. Yun\par \par BPH\par - c/w  tamsulosin 0.4mg as directed by his urologist\par \par CKD: \par - advised regular follow up w/ his nephro \par - will get CMP- bloodwork drawn in office today, will call w/ results and have him rtc sooner if any abnormalities\par - UA\par \par anemia: \par previous anemia workup was negative for vitamin b12, FA or iron deficiency.\par PSA negative, recently seen by his urologist\par colonosocopy screening seems to be UTD\par - CBC collected today. if still anemic or if downtrending, will refer to heme onc for further evaluation \par - UA to evaluate for hematuria\par - advised to ff/u w/ GI when is next recommended colonoscopy \par  \par thyroid nodules: \par noted some thyromegaly on exam\par TFTs wnl\par hx nodule rquiring biopsy\par - will get thyroid sono- script given\par - will obtain records from dr mac

## 2021-11-10 NOTE — HISTORY OF PRESENT ILLNESS
[FreeTextEntry1] : Pt is here for HTN. [de-identified] : 56y M w/ PMhx HTN, chronic back pain, allergic rhinitis, hypothyroidism, asthma, BPH, ALYSIA on CPAP, glaucoma, anemia, hypothyroidism, CKD, insomnia, depression, thyroid nodules, presenting for followup. \par \par HTN: amlo 5 and losartan 25. BP elevated on last visit. he was advised to cut down on coffee and salt intake. he is drinking green tea now and has been trying to cut down on coffee and fast food. his BP was 141/82 this morning with his BP machine at home. he reports having /80s on average.\par \par hypothyroid: levo 50\par \par allergic rhinitis: levocetirizine 5\par \par insomnia/depression: trazodone 50 PRN and bupropion 150 every other day as prescribed by his psych doctor Dr. Yun\par \par glaucoma: latanoprost  eye drops\par \par pain on elbows and knees post work injury: oxycodone prn and baby aspirin and carisoprol 350 prn. has pain doctor Dr. Kemp at [facility]. he also sees Dr. Hensley in the same office. \par \par BPH tamsulosin 0.4mg which is prescribed by his urologist Dr. Harris by [place].\par \par CKD: his nephro is Dr. Carrasco in [facility].  on losartan 25. \par \par anemia: family hx anemia. bloodwork showed slight improvement on last set of labs.he is feelling well. \par \par asthma: slight insp wheezing on last visit. on advair and ventolin and monteleukast\par  \par thyroid nodules: he was getting surveillance in the past. he had a biopsy done several years ago. last had sono over a year ago. his old endo was Dr. Hamm- who retired. \par \par HCM had colonoscopy on 8/2018- found to have rectal polyp. previous report from GI advised to repeat in 5 years for polyp surveillance.  but he was told to repeat in 3.5-4 years. \par

## 2021-11-11 LAB
ALBUMIN SERPL ELPH-MCNC: 4.7 G/DL
ALP BLD-CCNC: 53 U/L
ALT SERPL-CCNC: 12 U/L
ANION GAP SERPL CALC-SCNC: 13 MMOL/L
APPEARANCE: CLEAR
AST SERPL-CCNC: 15 U/L
BACTERIA: NEGATIVE
BASOPHILS # BLD AUTO: 0.09 K/UL
BASOPHILS NFR BLD AUTO: 1.6 %
BILIRUB SERPL-MCNC: 0.6 MG/DL
BILIRUBIN URINE: NEGATIVE
BLOOD URINE: NEGATIVE
BUN SERPL-MCNC: 16 MG/DL
CALCIUM SERPL-MCNC: 9.9 MG/DL
CHLORIDE SERPL-SCNC: 103 MMOL/L
CO2 SERPL-SCNC: 26 MMOL/L
COLOR: YELLOW
CREAT SERPL-MCNC: 1.35 MG/DL
CREAT SPEC-SCNC: 287 MG/DL
EOSINOPHIL # BLD AUTO: 0.19 K/UL
EOSINOPHIL NFR BLD AUTO: 3.3 %
GLUCOSE QUALITATIVE U: NEGATIVE
GLUCOSE SERPL-MCNC: 99 MG/DL
HCT VFR BLD CALC: 40.4 %
HGB BLD-MCNC: 12.7 G/DL
HYALINE CASTS: 1 /LPF
IMM GRANULOCYTES NFR BLD AUTO: 0.3 %
KETONES URINE: NEGATIVE
LEUKOCYTE ESTERASE URINE: NEGATIVE
LYMPHOCYTES # BLD AUTO: 2.7 K/UL
LYMPHOCYTES NFR BLD AUTO: 46.6 %
MAN DIFF?: NORMAL
MCHC RBC-ENTMCNC: 31.4 GM/DL
MCHC RBC-ENTMCNC: 32.2 PG
MCV RBC AUTO: 102.5 FL
MICROALBUMIN 24H UR DL<=1MG/L-MCNC: <1.2 MG/DL
MICROALBUMIN/CREAT 24H UR-RTO: NORMAL MG/G
MICROSCOPIC-UA: NORMAL
MONOCYTES # BLD AUTO: 0.46 K/UL
MONOCYTES NFR BLD AUTO: 7.9 %
NEUTROPHILS # BLD AUTO: 2.33 K/UL
NEUTROPHILS NFR BLD AUTO: 40.3 %
NITRITE URINE: NEGATIVE
PH URINE: 6
PLATELET # BLD AUTO: 259 K/UL
POTASSIUM SERPL-SCNC: 4.2 MMOL/L
PROT SERPL-MCNC: 7.7 G/DL
PROTEIN URINE: NORMAL
RBC # BLD: 3.94 M/UL
RBC # FLD: 11.8 %
RED BLOOD CELLS URINE: 3 /HPF
SODIUM SERPL-SCNC: 142 MMOL/L
SPECIFIC GRAVITY URINE: 1.02
SQUAMOUS EPITHELIAL CELLS: 0 /HPF
UROBILINOGEN URINE: NORMAL
WBC # FLD AUTO: 5.79 K/UL
WHITE BLOOD CELLS URINE: 1 /HPF

## 2021-11-23 ENCOUNTER — NON-APPOINTMENT (OUTPATIENT)
Age: 56
End: 2021-11-23

## 2021-12-08 ENCOUNTER — APPOINTMENT (OUTPATIENT)
Dept: SLEEP CENTER | Facility: CLINIC | Age: 56
End: 2021-12-08
Payer: MEDICARE

## 2021-12-08 ENCOUNTER — OUTPATIENT (OUTPATIENT)
Dept: OUTPATIENT SERVICES | Facility: HOSPITAL | Age: 56
LOS: 1 days | End: 2021-12-08
Payer: MEDICARE

## 2021-12-08 PROCEDURE — 95810 POLYSOM 6/> YRS 4/> PARAM: CPT | Mod: 26

## 2021-12-08 PROCEDURE — 95810 POLYSOM 6/> YRS 4/> PARAM: CPT

## 2021-12-09 ENCOUNTER — RX RENEWAL (OUTPATIENT)
Age: 56
End: 2021-12-09

## 2021-12-09 DIAGNOSIS — G47.33 OBSTRUCTIVE SLEEP APNEA (ADULT) (PEDIATRIC): ICD-10-CM

## 2021-12-15 ENCOUNTER — APPOINTMENT (OUTPATIENT)
Dept: FAMILY MEDICINE | Facility: CLINIC | Age: 56
End: 2021-12-15
Payer: MEDICARE

## 2021-12-15 VITALS — HEART RATE: 81 BPM | OXYGEN SATURATION: 98 % | RESPIRATION RATE: 14 BRPM | TEMPERATURE: 97.3 F

## 2021-12-15 VITALS — SYSTOLIC BLOOD PRESSURE: 158 MMHG | DIASTOLIC BLOOD PRESSURE: 92 MMHG

## 2021-12-15 PROCEDURE — 99214 OFFICE O/P EST MOD 30 MIN: CPT

## 2021-12-15 NOTE — ASSESSMENT
[FreeTextEntry1] : Physical Exam:\par Constitutional: No acute distress, well appearing\par HEENT: Normocephalic, atraumatic\par Neck: supple\par Cardiac: S1S2, Regular rate and rhythm, No murmurs\par Pulmonary: No respiratory distress, Lungs clear to auscultation bilaterally, no wheezing, rales, or rhonchi\par Abdomen: Soft, non-tender, non-distended, no guarding, normal bowel sounds\par Vascular: No peripheral edema\par Neurology: Coordination grossly intact, no focal deficits\par Psychiatric: Alert and oriented x3, normal mood\par \par \par \par A/P:\par HTN:\par BP elevated still. also confirmed with his home BP machine that he brought with him today.\par - will increased losartan to 100 daily. c/w amlo 5\par - f/u 1 month for BP check\par \par hypothyroidism: \par TFTs stable \par - c/w levo 50  \par \par CKD:\par stable\par - advised regular follow up with nephrology\par \par asthma:\par stable \par - c/w inhalers as needed\par \par Anemia of unknown etiology. \par improvement but still anemic\par - pt will make appt with heme onc- given referral again today\par \par thyroid nodules: \par sono done 11/2021: stable 5mm nodule, no changes from prior\par - will make appt with new endo- given referral again today

## 2021-12-15 NOTE — HISTORY OF PRESENT ILLNESS
[FreeTextEntry1] : Pt is here for BP check up and medication renewal. [de-identified] : 56y M w/ PMHx HTN, chronic back pain, allergic rhinitis, hypothyroidism, asthma, BPH, ALYSIA on CPAP, glaucoma, anemia, CKD, insomnia, depression, thyroid nodules, presenting for f/u.\par \par HTN: increased losartan to 50 on last visit which he reports compliance. also still on amlo 5. \par \par hypothyroidism: on levo 50  \par \par CKD: nephrology Dr. Carrasco. last set of bloodwork from 11/2021 showed persistent CKD but stable\par \par asthma: advair, monteleukast, ventolin\par \par Anemia of unknown etiology. slight improvement on last bloodwork but still anemic, chronic. family hx anemia. UTD on colonoscopy- to be done again 8/2023. has not made appt with heme onc yet. \par \par thyroid nodules: thyromegaly on last visit. TFTs wnl but had biopsy done years ago. sono done 11/2021: stable 5mm nodule, no changes from prior. retired endo Dr. Hamm- has not made appt with new endo yet.  \par

## 2021-12-21 NOTE — HISTORY OF PRESENT ILLNESS
[Obstructive Sleep Apnea] : obstructive sleep apnea [Awakes Unrefreshed] : awakes unrefreshed [Difficulty Maintaining Sleep] : difficulty maintaining sleep [Frequent Nocturnal Awakening] : frequent nocturnal awakening [Tired while Driving] : tired while driving [Awakes with Dry Mouth] : awakes with dry mouth [Awakes with Headache] : awakes with headache [Daytime Somnolence] : daytime somnolence [Difficulty Initiating Sleep] : difficulty initiating sleep [Fatigue] : fatigue [Witnessed Apneas] : witnessed apneas [Snoring] : snoring [Unintentional Sleep while Inactive] : unintentional sleep while inactive [TextBox_4] : 56 yo M with hx of HTN, chronic back pain, rhinitis, hypothyroid, asthma, BPH presenting for ALYSIA management/ Inspire candidacy. Patient was diagnosed with sleep apnea via a home test 2-3 years ago (unknown severity) and was provided a CPAP machine (Airsense 10). He has used it intermittently, with the last use 2 weeks ago. He has daytime somnolence and fatigue with some night time awakenings. These symptoms do improve with CPAP use, but he forgets to wear it some times and was curious about alternative therapy. He was interested in the Inspire device and saw Dr. Ochoa, who referred him to us for further eval. \par He has also tried an oral device in the past, which did not provide the same relief the PAP did.\par \par ESS  - 10 [Cataplexy] : denies cataplexy [Hypnagogic Hallucinations] : denies hypnagogic hallucinations [Nonrestorative Sleep] : denies nonrestorative sleep [Paresthesias] : denies paresthesias [Unusual Movements] : no unusual movements [Unusual Sleep Behavior] : no unusual sleep behavior [Vivid dreams] : no vivid dreams [TextBox_77] : 2AM [TextBox_79] : 9AM [TextBox_81] : 1-2h  [TextBox_85] : 7h [ESS] : 10

## 2021-12-21 NOTE — ASSESSMENT
[FreeTextEntry1] : 56 yo M with hx of HTN, chronic back pain, rhinitis, hypothyroid, asthma, BPH presenting for ALYSIA management/ Inspire candidacy.\par \par 1) ALYSIA - Patient is currently symptomatic from untreated ALYSIA and has history of glaucoma and HTN, both of which are associated with ALYSIA. Therapeutic options and implications have been thoroughly discussed. He currently is on multiple medications including a muscle relaxant and opioid for his chronic back pain. Implantation of the Inspire device will preclude him from obtaining MRI of back which he is likely to need in the future based on his back complaints (Lumbar MRI) for orthopedic work up. For now, we will gauge the severity of his sleep apnea and his optimal CPAP settings/ mask interface via a split night study. The diagnostic component will also help diagnose the proportion of central apneas, which may be significant for this patient given his opioid use. We will also talk to [facility] to download his previous usage data to further elucidate ideal PAP settings as well. he will continue to consider Inspire as an option but at this will reassess his ability to utilize cpap after optimization. also he will review the potential need for spine MRI with his orthopedist before deciding on whether to pursue Inspire. he ramifications of obstructive sleep apnea and its potential therapeutic modalities were discussed with the patient. The dangers of drowsy driving were discussed with the patient. The patient was warned to avoid drowsy driving. The patient will followup after results of split night study are available.\par

## 2021-12-29 ENCOUNTER — NON-APPOINTMENT (OUTPATIENT)
Age: 56
End: 2021-12-29

## 2022-01-04 ENCOUNTER — OUTPATIENT (OUTPATIENT)
Dept: OUTPATIENT SERVICES | Facility: HOSPITAL | Age: 57
LOS: 1 days | Discharge: HOME | End: 2022-01-04

## 2022-01-04 DIAGNOSIS — D64.9 ANEMIA, UNSPECIFIED: ICD-10-CM

## 2022-01-06 ENCOUNTER — APPOINTMENT (OUTPATIENT)
Dept: HEMATOLOGY ONCOLOGY | Facility: CLINIC | Age: 57
End: 2022-01-06

## 2022-01-19 ENCOUNTER — APPOINTMENT (OUTPATIENT)
Dept: FAMILY MEDICINE | Facility: CLINIC | Age: 57
End: 2022-01-19
Payer: MEDICARE

## 2022-01-19 VITALS — SYSTOLIC BLOOD PRESSURE: 148 MMHG | DIASTOLIC BLOOD PRESSURE: 92 MMHG

## 2022-01-19 VITALS
HEART RATE: 90 BPM | TEMPERATURE: 97.8 F | WEIGHT: 248 LBS | OXYGEN SATURATION: 97 % | HEIGHT: 70 IN | BODY MASS INDEX: 35.5 KG/M2

## 2022-01-19 PROCEDURE — 99214 OFFICE O/P EST MOD 30 MIN: CPT

## 2022-01-19 NOTE — HISTORY OF PRESENT ILLNESS
[FreeTextEntry1] : pt. here for b/p check [de-identified] : 56Y M w/ PMHx chronic back pain, hypothyroidism, asthma, BPH, ALYSIA on CPAP, glaucoma, anemia, CKD, depression, thyroid nodules, presenting for f/u.\par \par HTN: amlo 5 and we increased losartan to 100 daily last visit. reports compliance. has  noted  his BP to be high at home, usually > 140/90\par \par hypothyroid: levo 50\par \par CKD: stable. nephro Dr. Sparks\par \par anemia of unknown etiology: family hx.  he had an appt with heme onc Dr. Roe on 1/6 but missed it. he will call to reschedule. \par \par thyroid nodules:  his old endo retired. he has an appt with a new endo at [facility] on 2/3 and is requesting us to send his blood test results. \par \par he has an appt with GI Dr. Jovanny Jo on 3/1 for colonoscopy consultation.

## 2022-01-19 NOTE — ASSESSMENT
[FreeTextEntry1] : Physical Exam:\par Constitutional: No acute distress, well appearing\par HEENT: Normocephalic, atraumatic\par Neck: supple\par Cardiac: S1S2, Regular rate and rhythm, No murmurs\par Pulmonary: No respiratory distress, Lungs clear to auscultation bilaterally, no wheezing, rales, or rhonchi\par Abdomen: Soft, non-tender, non-distended, no guarding, normal bowel sounds\par Vascular: No peripheral edema\par Neurology: Coordination grossly intact, no focal deficits\par Psychiatric: Alert and oriented x3, normal mood\par \par \par \par a/P:\par HTN: \par BP elevated \par - will increase amlodipine to 10mg daily. c/w losartan 100 daily\par - f/u 1 month\par \par hypothyroid/ thyroid nodules\par last TFTs wnl, \par stable\par -c /w levo 50\par - will send blood test results to his new endo office\par \par CKD:\par  stable. nephro Dr. Sparks\par \par anemia of unknown etiology: \par - he will reschedule appt with hem onc\par \par HCM:\par - advised f/u w/ GI for screening colonoscopy, he has an appt

## 2022-02-16 ENCOUNTER — RX RENEWAL (OUTPATIENT)
Age: 57
End: 2022-02-16

## 2022-02-17 ENCOUNTER — APPOINTMENT (OUTPATIENT)
Dept: FAMILY MEDICINE | Facility: CLINIC | Age: 57
End: 2022-02-17
Payer: MEDICARE

## 2022-02-17 VITALS
RESPIRATION RATE: 14 BRPM | HEART RATE: 80 BPM | TEMPERATURE: 97.2 F | WEIGHT: 235 LBS | DIASTOLIC BLOOD PRESSURE: 88 MMHG | BODY MASS INDEX: 33.64 KG/M2 | SYSTOLIC BLOOD PRESSURE: 133 MMHG | HEIGHT: 70 IN | OXYGEN SATURATION: 99 %

## 2022-02-17 VITALS — SYSTOLIC BLOOD PRESSURE: 128 MMHG | DIASTOLIC BLOOD PRESSURE: 88 MMHG

## 2022-02-17 PROCEDURE — 99214 OFFICE O/P EST MOD 30 MIN: CPT

## 2022-02-17 NOTE — HISTORY OF PRESENT ILLNESS
[FreeTextEntry1] :  Patient is for follow up on BP  [de-identified] : 56y M w/ PMhx chronic back pain, hypothryoidism, asthma, BPH, ALYSIA on CPAP, glaucoma, anemia, CKD, depression, thyroid nodules, presenting for f/u.\par \par HTN: amlo increased from 5 to 10 last visit. c/w losartan 100. today is 1 month check. reports normal values at home. he ran out of his amlodipine yesterday. \par \par CKD: stable nephrology Dr. Carrasco. he has an appt w/ him on 3/10. \par \par Anemia of unknown etiology: he has not seen heme onc Dr. Roe yet, did not have time to reschedule yet. \par \par Thyroid nodules/hypothyroid: levo 50. saw new endo Dr. Donald Genao at [facility] on 2/3. in [address] [place] [address] in [facility]. he is sending him for some type of testing but doesn remember what type of testing. requesting us to get his records. [id number]. \par \par HCM: screening scope- has appt w/ Dr. Jo on on 3/1.

## 2022-02-17 NOTE — ASSESSMENT
[FreeTextEntry1] : Physical Exam:\par Constitutional: No acute distress, well appearing\par HEENT: Normocephalic, atraumatic\par Neck: supple\par Cardiac: S1S2, Regular rate and rhythm, No murmurs\par Pulmonary: No respiratory distress, Lungs clear to auscultation bilaterally, no wheezing, rales, or rhonchi\par Abdomen: Soft, non-tender, non-distended, no guarding, normal bowel sounds\par Vascular: No peripheral edema\par Neurology: Coordination grossly intact, no focal deficits\par Psychiatric: Alert and oriented x3, normal mood\par \par \par \par A/P:\par HTN:\par BP within goal today\par - will c/w amlo 10 and losartan 100\par - refills sent\par - f/u 3 months\par \par CKD: \par stable\par - advised regular follow up with nephro\par \par Anemia of unknown etiology: \par - advised to make appt with heme onc for further eval, he will make appt\par \par Thyroid nodules/hypothyroid: \par TFTs stable\par - w/c w levo 50. \par - will obtain records from his endo Dr. Genao \par saw new endo Dr. Donald Genao at [facility] on 2/3. in [address] [facility] in [facility]. he is sending him for some type of testing but doesn remember what type of testing. requesting us to get his records. [id number]. \par \par HCM: \par screening scope- has appt w/ Dr. Jo on on 3/1

## 2022-03-01 ENCOUNTER — APPOINTMENT (OUTPATIENT)
Dept: GASTROENTEROLOGY | Facility: CLINIC | Age: 57
End: 2022-03-01
Payer: MEDICARE

## 2022-03-01 VITALS
SYSTOLIC BLOOD PRESSURE: 140 MMHG | OXYGEN SATURATION: 99 % | WEIGHT: 240 LBS | DIASTOLIC BLOOD PRESSURE: 85 MMHG | TEMPERATURE: 97.5 F | RESPIRATION RATE: 14 BRPM | HEIGHT: 70 IN | BODY MASS INDEX: 34.36 KG/M2 | HEART RATE: 77 BPM

## 2022-03-01 PROCEDURE — 99204 OFFICE O/P NEW MOD 45 MIN: CPT

## 2022-03-01 RX ORDER — TRAZODONE HYDROCHLORIDE 50 MG/1
50 TABLET ORAL
Qty: 90 | Refills: 0 | Status: ACTIVE | COMMUNITY
Start: 2021-09-22

## 2022-03-01 RX ORDER — BUPROPION HYDROCHLORIDE 150 MG/1
150 TABLET, FILM COATED, EXTENDED RELEASE ORAL
Refills: 0 | Status: ACTIVE | COMMUNITY

## 2022-03-01 RX ORDER — OXYCODONE AND ACETAMINOPHEN 5; 325 MG/1; MG/1
5-325 TABLET ORAL 3 TIMES DAILY
Qty: 90 | Refills: 0 | Status: ACTIVE | COMMUNITY

## 2022-03-01 RX ORDER — ASPIRIN 81 MG
81 TABLET, DELAYED RELEASE (ENTERIC COATED) ORAL
Refills: 0 | Status: ACTIVE | COMMUNITY

## 2022-03-01 RX ORDER — TAMSULOSIN HYDROCHLORIDE 0.4 MG/1
0.4 CAPSULE ORAL
Qty: 90 | Refills: 0 | Status: ACTIVE | COMMUNITY

## 2022-03-01 NOTE — REASON FOR VISIT
[Consultation] : a consultation visit [FreeTextEntry1] : Standing mild anemia.  Personal history of colon polyps.  History of diverticulosis.

## 2022-03-01 NOTE — PHYSICAL EXAM
[Sclera] : the sclera and conjunctiva were normal [PERRL With Normal Accommodation] : pupils were equal in size, round, and reactive to light [Extraocular Movements] : extraocular movements were intact [Outer Ear] : the ears and nose were normal in appearance [Oropharynx] : the oropharynx was normal [Neck Appearance] : the appearance of the neck was normal [Neck Cervical Mass (___cm)] : no neck mass was observed [Jugular Venous Distention Increased] : there was no jugular-venous distention [Thyroid Diffuse Enlargement] : the thyroid was not enlarged [Thyroid Nodule] : there were no palpable thyroid nodules [Auscultation Breath Sounds / Voice Sounds] : lungs were clear to auscultation bilaterally [Heart Rate And Rhythm] : heart rate was normal and rhythm regular [Heart Sounds] : normal S1 and S2 [Heart Sounds Gallop] : no gallops [Murmurs] : no murmurs [Heart Sounds Pericardial Friction Rub] : no pericardial rub [Bowel Sounds] : normal bowel sounds [Abdomen Soft] : soft [Abdomen Tenderness] : non-tender [] : no hepato-splenomegaly [Abdomen Mass (___ Cm)] : no abdominal mass palpated [FreeTextEntry1] : Obese.  No scars. [Internal Hemorrhoid] : no internal hemorrhoids [External Hemorrhoid] : no external hemorrhoids [Occult Blood Positive] : stool was negative for occult blood [No CVA Tenderness] : no ~M costovertebral angle tenderness [No Spinal Tenderness] : no spinal tenderness [Abnormal Walk] : normal gait [Nail Clubbing] : no clubbing  or cyanosis of the fingernails [Musculoskeletal - Swelling] : no joint swelling seen [Motor Tone] : muscle strength and tone were normal

## 2022-03-01 NOTE — ASSESSMENT
[FreeTextEntry1] : Personal history of colon polyps.  One polyp in 2015 and diminutive polyp in 2018.  Asymptomatic from GI point of view.  Tiny polyps are not the cause for chronic anemia.\par Family history is negative for colorectal neoplasia.  Physical exam is normal.\par Awaits hematology evaluation for chronic anemia.\par Given personal history of prior colon polyp, a polyp surveillance colonoscopy at 5-year interval is appropriate.  Patient was advised to return here in 1.5 years to schedule his polyp surveillance colonoscopy.\par No upper GI issues.\par Diverticulosis.  Asymptomatic.  Remains on for high-fiber diet, appropriately so.\par Internal hemorrhoids.  Asymptomatic.  Maintain high-fiber diet.

## 2022-03-01 NOTE — HISTORY OF PRESENT ILLNESS
[FreeTextEntry1] : 56-year-old -American male with history of hypertension asthma obesity depression and personal history of colon polyps.  Patient has had multiple prior colonoscopies in the past.  Last being 3.5 years ago.  His initial colonoscopy was in 2009 with Dr. Chandler Salgado in [place] and was negative.\par A subsequent colonoscopy in 2015 revealed an 8 mm polyp.  Pathology not available.  A surveillance colonoscopy done at 2 years in 8/18 by me showed diverticulosis and a tiny rectal polyp which on pathology was hyperplastic and internal hemorrhoids.  Patient had been advised a 5-year interval for next colonoscopy.  \par \par He remains asymptomatic from a GI point of view.  He is having 1-2 formed easy bowel movements per day.  No rectal bleeding or abdominal pain or weight loss or alteration in pattern of bowel movements.  No upper GI symptomatology.\par Review of blood work indicates hemoglobins to run anywhere from high 11's to high 12 range with MCV of about 103.  Prior iron panels and B12 and folate were all normal.  Etiology for is mild anemia, chronic over 4 years is unexplained.  Awaits hematology evaluation.\par No new medical issues.

## 2022-05-12 ENCOUNTER — RX RENEWAL (OUTPATIENT)
Age: 57
End: 2022-05-12

## 2022-06-08 ENCOUNTER — APPOINTMENT (OUTPATIENT)
Dept: FAMILY MEDICINE | Facility: CLINIC | Age: 57
End: 2022-06-08
Payer: MEDICARE

## 2022-06-08 VITALS
RESPIRATION RATE: 14 BRPM | HEIGHT: 70 IN | SYSTOLIC BLOOD PRESSURE: 145 MMHG | TEMPERATURE: 97.3 F | WEIGHT: 241 LBS | DIASTOLIC BLOOD PRESSURE: 88 MMHG | HEART RATE: 79 BPM | OXYGEN SATURATION: 97 % | BODY MASS INDEX: 34.5 KG/M2

## 2022-06-08 VITALS — SYSTOLIC BLOOD PRESSURE: 129 MMHG | DIASTOLIC BLOOD PRESSURE: 88 MMHG

## 2022-06-08 DIAGNOSIS — L98.9 DISORDER OF THE SKIN AND SUBCUTANEOUS TISSUE, UNSPECIFIED: ICD-10-CM

## 2022-06-08 DIAGNOSIS — F32.A DEPRESSION, UNSPECIFIED: Chronic | ICD-10-CM

## 2022-06-08 PROCEDURE — 99214 OFFICE O/P EST MOD 30 MIN: CPT | Mod: 25

## 2022-06-08 PROCEDURE — 36415 COLL VENOUS BLD VENIPUNCTURE: CPT

## 2022-06-08 RX ORDER — AMLODIPINE BESYLATE 10 MG/1
10 TABLET ORAL
Qty: 30 | Refills: 0 | Status: DISCONTINUED | COMMUNITY
Start: 2022-05-12 | End: 2022-06-08

## 2022-06-08 NOTE — ASSESSMENT
[FreeTextEntry1] : Physical Exam:\par Constitutional: No acute distress, well appearing\par HEENT: Normocephalic, atraumatic\par Neck: supple\par Cardiac: S1S2, Regular rate and rhythm, No murmurs\par Pulmonary: No respiratory distress, Lungs clear to auscultation bilaterally, no wheezing, rales, or rhonchi\par Abdomen: Soft, non-tender, non-distended, no guarding, normal bowel sounds\par Vascular: No peripheral edema\par Neurology: Coordination grossly intact, no focal deficits\par Psychiatric: Alert and oriented x3, normal mood\par \par \par \par a/P;\par HTN: \par BP controlled on past 2 visits\par - c/w amlo 10 and losartan 100\par - can f/u in 6 months\par - he knows to rtc sooner if notices his BP consistently > 140 or /> 90 at home\par \par anemia of unknown etiology: \par reviewed bloodwork from nephrology labs in march  hb 12.4 then\par - CBC checked today- bloodwork drawn in office- will call w/ results\par \par thyroid nodules/ hypothyroidism: \par  TFTs controlled.  \par - c/w levothyroxine\par - f/u endo for thyroid nodule surveillance\par \par skin lesions:\par darkly pigmented oval lesions scattered throughout arms and legs\par - derm referral

## 2022-06-08 NOTE — HISTORY OF PRESENT ILLNESS
[FreeTextEntry1] : Patient is here for Bp Check  [de-identified] : 56y M w/ PMhx chronic back pain, hypothyroidism, asthma, BPH, ALYSIA on CPAP, glaucoma, anemia, CKD, depression, thyroid nodules, HTN, presenting for f/u. no complaints. \par \par HTN: amlo 10 and losartan 100. reports compliance with his meds, however he ran out of amlodipine a week ago. \par \par CKD: stable. nephro Dr. Vera\par \par anemia of unknown etiology: evaluated by GI Dr Ontiveros- hx polyps in the past but GI does not think this is cause of mild chronic anemia. he has not seen heme onc yet.  was told by another doctor that his CBC was now normal when it was recently checked. \par \par thyroid nodules/ hypothyroidism: levo 50. TFTs controlled.  endo Dr. Birgit watson- reports that is planned to get another thyroid sono done in august. \par \par HCM next colonoscopy planned in 9/2023

## 2022-06-10 ENCOUNTER — NON-APPOINTMENT (OUTPATIENT)
Age: 57
End: 2022-06-10

## 2022-06-10 LAB
BASOPHILS # BLD AUTO: 0.06 K/UL
BASOPHILS NFR BLD AUTO: 1.1 %
EOSINOPHIL # BLD AUTO: 0.17 K/UL
EOSINOPHIL NFR BLD AUTO: 3 %
HCT VFR BLD CALC: 37.6 %
HGB BLD-MCNC: 12.2 G/DL
IMM GRANULOCYTES NFR BLD AUTO: 0.4 %
LYMPHOCYTES # BLD AUTO: 1.91 K/UL
LYMPHOCYTES NFR BLD AUTO: 33.5 %
MAN DIFF?: NORMAL
MCHC RBC-ENTMCNC: 32.2 PG
MCHC RBC-ENTMCNC: 32.4 GM/DL
MCV RBC AUTO: 99.2 FL
MONOCYTES # BLD AUTO: 0.61 K/UL
MONOCYTES NFR BLD AUTO: 10.7 %
NEUTROPHILS # BLD AUTO: 2.94 K/UL
NEUTROPHILS NFR BLD AUTO: 51.3 %
PLATELET # BLD AUTO: 263 K/UL
RBC # BLD: 3.79 M/UL
RBC # FLD: 12.2 %
WBC # FLD AUTO: 5.71 K/UL

## 2023-01-24 ENCOUNTER — FORM ENCOUNTER (OUTPATIENT)
Age: 58
End: 2023-01-24

## 2023-02-15 ENCOUNTER — APPOINTMENT (OUTPATIENT)
Dept: FAMILY MEDICINE | Facility: CLINIC | Age: 58
End: 2023-02-15
Payer: MEDICARE

## 2023-02-15 ENCOUNTER — NON-APPOINTMENT (OUTPATIENT)
Age: 58
End: 2023-02-15

## 2023-02-15 ENCOUNTER — FORM ENCOUNTER (OUTPATIENT)
Age: 58
End: 2023-02-15

## 2023-02-15 VITALS
TEMPERATURE: 98.12 F | RESPIRATION RATE: 15 BRPM | DIASTOLIC BLOOD PRESSURE: 84 MMHG | HEART RATE: 74 BPM | BODY MASS INDEX: 35.22 KG/M2 | HEIGHT: 70 IN | OXYGEN SATURATION: 96 % | SYSTOLIC BLOOD PRESSURE: 133 MMHG | WEIGHT: 246 LBS

## 2023-02-15 DIAGNOSIS — M54.2 CERVICALGIA: ICD-10-CM

## 2023-02-15 DIAGNOSIS — M75.80 OTHER SHOULDER LESIONS, UNSPECIFIED SHOULDER: ICD-10-CM

## 2023-02-15 DIAGNOSIS — R94.31 ABNORMAL ELECTROCARDIOGRAM [ECG] [EKG]: ICD-10-CM

## 2023-02-15 DIAGNOSIS — M79.603 PAIN IN ARM, UNSPECIFIED: ICD-10-CM

## 2023-02-15 DIAGNOSIS — E04.1 NONTOXIC SINGLE THYROID NODULE: ICD-10-CM

## 2023-02-15 PROCEDURE — 99396 PREV VISIT EST AGE 40-64: CPT | Mod: 25,GY

## 2023-02-15 PROCEDURE — 93000 ELECTROCARDIOGRAM COMPLETE: CPT

## 2023-02-15 RX ORDER — CYCLOBENZAPRINE HYDROCHLORIDE 10 MG/1
10 TABLET, FILM COATED ORAL
Refills: 0 | Status: ACTIVE | COMMUNITY
Start: 2023-02-15

## 2023-02-15 RX ORDER — CARISOPRODOL 350 MG/1
350 TABLET ORAL
Refills: 0 | Status: DISCONTINUED | COMMUNITY
End: 2023-02-15

## 2023-02-15 RX ORDER — FLUTICASONE PROPIONATE AND SALMETEROL 50; 500 UG/1; UG/1
500-50 POWDER RESPIRATORY (INHALATION) TWICE DAILY
Refills: 0 | Status: DISCONTINUED | COMMUNITY
Start: 2021-09-22 | End: 2023-02-15

## 2023-02-16 NOTE — HISTORY OF PRESENT ILLNESS
[FreeTextEntry1] : cpe [de-identified] : 57y M w/ PMHx hypothyroidism, thyroid nodules, asthma, BPH, ALYSIA on CPAP, glaucoma, anemia, CKD, depression, htn, presenting for CPE.\par \par HTN amlo 10, losartan 100\par \par CKD stable. nephro Dr. Carrasco. \par \par anemia of unknown etiology: mild. has not seen heme onc as prev advised.  hx tiny polyps- not cause of his anemia as per GI Dr. godoy. next colonoscopy to be done 9/2023\par \par thyroid nodules/ hypothyroidism: levo 50. endo Dr. Birgit leal. had repeat sono in august 2022: 2 subcm nodules in the left hemithyroid. \par \par pt reports having neck pain.  pain starts from back of his neck and travels down his arm. tingling shooting pain. happened after lifting weight in july when working out. saw ortho Dr. Khan. MRI L shoulder showed rotator cuff intact although theres absent and torn longhead of the biceps tendon noted with degenerative changes. surgery was recommended. he is requesting referral for ortho sports who can do surgery for him. he received steroid shots to the shoulder and elbow in july and septemebr 2022. he was also provided referral for neurosurgery regarding the radiculopathy. xx\par \par he also states he had some medication changes since he was last seen. he is now on wixela 500/50 instead of advair. reports it was changed by his pulmonary doctor from his job/ [employer].\par \par he was also put on gabapetnin 600 BID by Dr. Chase sports doctor? as well as cyclobenzaprine 10mg to take only as prn. \par \par smokes cigars very occasionally such as while on vacation.\par \par he sees urologist Dr. Elijah Harris in [facility]. states his psa was recently 3.7. he went to see him bc  he was going to the bathroom frequently. does not know results of his MRI prostate but states it was abnormal and he is pending a procedure. \par \par takes vitamin d and e

## 2023-02-16 NOTE — ADDENDUM
[FreeTextEntry1] : received last office note from urology Dr. Harris from 2/14. states pt had abnormal MRI (MRI report not sent) and prostate biopsy was recommended.

## 2023-02-16 NOTE — ASSESSMENT
[FreeTextEntry1] : Physical Exam:\par Constitutional: No acute distress, well appearing\par HEENT: Normocephalic, atraumatic\par Neck: supple\par Cardiac:  Regular rate and rhythm, No murmurs\par Pulmonary: No respiratory distress, Lungs clear to auscultation bilaterally, no wheezing, rales, or rhonchi\par Abdomen: Soft, non-tender, non-distended, no guarding, normal bowel sounds\par Vascular: No peripheral edema\par Neurology: Coordination grossly intact, no focal deficits\par Psychiatric: Alert and oriented x3, normal mood\par \par \par \par A/P:\par HCM:\par - he will get fasting bloodwork done at outside lab\par - flu- UTD\par - TDAP- UTD\par - shingles vaccine- he wants to verify with his insurance first\par - Colon CA screening- UTD, due in the fall \par - PSA screening- will obtin\par - EKG shows first degree block. no changes from prior EKG done 11/2020. pt reports he has seen cardiology for this. cardiac note in chart from 8/2021 when he saw Dr. Mock. pt reports all testing was negative including stress echo, carotid doppler, 10 day monitor.  pt with no sx at this time. \par \par HTN\par bp controlled\par - c/w  amlo 10, losartan 100\par - f/u 3 months\par \par CKD\par  stable\par - regular follow up with neprho. \par \par anemia of unknown etiology:\par - will obtain CBC\par - pt agreeable to see heme/onc- referral provided again\par \par thyroid nodules/ hypothyroidism: \par sono shows subcm nodules, stable\par - will obtain TFTs\par - will send refill for levo 50 when obtain results\par \par cervical radiculopathy\par - he has referral to see neurosurgery\par \par shoulder pain\par - provided refrral for ortho\par \par bph:\par pending possibly bx of prostate?\par - will obtain record of last encounter with urology for further clarification\par \par i have asked him to bring a copy of all this doctors/ specialists actively involved in his care as he seems to be on a lot of different medications from multiple different doctors. he is not able to tell me some of their indications or who they are from.

## 2023-02-16 NOTE — HEALTH RISK ASSESSMENT
[Yes] : Yes [2 - 3 times a week (3 pts)] : 2 - 3  times a week (3 points) [1 or 2 (0 pts)] : 1 or 2 (0 points) [Never (0 pts)] : Never (0 points) [No] : In the past 12 months have you used drugs other than those required for medical reasons? No [0] : 2) Feeling down, depressed, or hopeless: Not at all (0) [PHQ-2 Negative - No further assessment needed] : PHQ-2 Negative - No further assessment needed [Patient reported colonoscopy was abnormal] : Patient reported colonoscopy was abnormal [With Family] : lives with family [Employed] : employed [] :  [# Of Children ___] : has [unfilled] children [Audit-CScore] : 3 [[id number]] : 0 [ColonoscopyDate] : 08/2018 [ColonoscopyComments] : polyp rectum. 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

## 2023-02-22 ENCOUNTER — APPOINTMENT (OUTPATIENT)
Dept: INTERNAL MEDICINE | Facility: CLINIC | Age: 58
End: 2023-02-22
Payer: MEDICARE

## 2023-02-22 VITALS
HEART RATE: 78 BPM | DIASTOLIC BLOOD PRESSURE: 80 MMHG | OXYGEN SATURATION: 98 % | RESPIRATION RATE: 15 BRPM | BODY MASS INDEX: 35.79 KG/M2 | SYSTOLIC BLOOD PRESSURE: 116 MMHG | HEIGHT: 70 IN | WEIGHT: 250 LBS | TEMPERATURE: 97.6 F

## 2023-02-22 DIAGNOSIS — Z01.818 ENCOUNTER FOR OTHER PREPROCEDURAL EXAMINATION: ICD-10-CM

## 2023-02-22 PROCEDURE — 99213 OFFICE O/P EST LOW 20 MIN: CPT

## 2023-02-23 LAB
APPEARANCE: CLEAR
BACTERIA: NEGATIVE
BILIRUBIN URINE: NEGATIVE
BLOOD URINE: NEGATIVE
COLOR: NORMAL
GLUCOSE QUALITATIVE U: NEGATIVE
HYALINE CASTS: 1 /LPF
KETONES URINE: NEGATIVE
LEUKOCYTE ESTERASE URINE: NEGATIVE
MICROSCOPIC-UA: NORMAL
NITRITE URINE: NEGATIVE
PH URINE: 7
PROTEIN URINE: NEGATIVE
RED BLOOD CELLS URINE: 1 /HPF
SPECIFIC GRAVITY URINE: 1.01
SQUAMOUS EPITHELIAL CELLS: 0 /HPF
UROBILINOGEN URINE: NORMAL
WHITE BLOOD CELLS URINE: 0 /HPF

## 2023-02-23 NOTE — HISTORY OF PRESENT ILLNESS
[No Pertinent Cardiac History] : no history of aortic stenosis, atrial fibrillation, coronary artery disease, recent myocardial infarction, or implantable device/pacemaker [Asthma] : asthma [Sleep Apnea] : sleep apnea [No Adverse Anesthesia Reaction] : no adverse anesthesia reaction in self or family member [Chronic Kidney Disease] : chronic kidney disease [(Patient denies any chest pain, claudication, dyspnea on exertion, orthopnea, palpitations or syncope)] : Patient denies any chest pain, claudication, dyspnea on exertion, orthopnea, palpitations or syncope [Poor (<4 METs)] : Poor (<4 METs) [COPD] : no COPD [Smoker] : not a smoker [Chronic Anticoagulation] : no chronic anticoagulation [Diabetes] : no diabetes [FreeTextEntry1] : biopsy of prostate [FreeTextEntry2] : 2/22/23 [FreeTextEntry3] : Dr Elijah Harris [FreeTextEntry4] : Mr. CORWIN BANKS  is    57 year male .He is a pt. of Dr Sherman.\par Past medical history of hypothyroidism, thyroid nodule, asthma, BPH, obstructive sleep apnea on CPAP, glaucoma, anemia, CKD, depression, hypertension.\par Patient presents today for evaluation for prostate biopsy his MRI of the prostate shows P1–RADS 4.  He denied urinary retention, increased frequency of urination.  Last PSA was 3.7.\par Pt. is over all feeling well.\par He denies chest pain, sob, palpitations.\par He denies easy bruising or bleeding\par No hx of surgical or anesthesia complications\par \par

## 2023-02-23 NOTE — ASSESSMENT
[Patient Optimized for Surgery] : Patient optimized for surgery [No Further Testing Recommended] : no further testing recommended [FreeTextEntry4] : Patient had preop labs done yesterday.  I will get the lab results and review.\par EKG:from 2/15/23 showed left atrial enlargement , No st-t wave changes. \par His blood pressure is on goal today with current medication.\par Asthma : symptoms are well controlled on Advair inhaler everyday , Ventolin prn.\par ALYSIA , compliant with CPAP machine.\par \par

## 2023-02-23 NOTE — ADDENDUM
[FreeTextEntry1] : I reviewed pre-op labs. CBC,CMP,UA\par All labs are with in normal range.\par pt. is currently medically stable for the planned procedure.\par Patient will avoid taking multivitamins and NSAIDs 5 days prior to the procedure.\par

## 2023-04-05 ENCOUNTER — OFFICE (OUTPATIENT)
Dept: URBAN - METROPOLITAN AREA CLINIC 114 | Facility: CLINIC | Age: 58
Setting detail: OPHTHALMOLOGY
End: 2023-04-05
Payer: MEDICARE

## 2023-04-05 DIAGNOSIS — H16.223: ICD-10-CM

## 2023-04-05 DIAGNOSIS — H25.13: ICD-10-CM

## 2023-04-05 DIAGNOSIS — H40.1132: ICD-10-CM

## 2023-04-05 PROCEDURE — 92083 EXTENDED VISUAL FIELD XM: CPT | Performed by: SPECIALIST

## 2023-04-05 PROCEDURE — 99213 OFFICE O/P EST LOW 20 MIN: CPT | Performed by: SPECIALIST

## 2023-04-05 ASSESSMENT — PACHYMETRY
OS_CT_CORRECTION: 0
OD_CT_CORRECTION: -1
OS_CT_UM: 545
OD_CT_UM: 568

## 2023-04-05 ASSESSMENT — REFRACTION_CURRENTRX
OS_CYLINDER: -0.50
OD_SPHERE: +0.75
OS_AXIS: 90
OS_SPHERE: +1.50
OS_OVR_VA: 20/
OD_OVR_VA: 20/

## 2023-04-05 ASSESSMENT — VISUAL ACUITY
OS_BCVA: 20/20
OD_BCVA: 20/20

## 2023-04-05 ASSESSMENT — SUPERFICIAL PUNCTATE KERATITIS (SPK)
OS_SPK: T
OD_SPK: T

## 2023-04-05 ASSESSMENT — CONFRONTATIONAL VISUAL FIELD TEST (CVF)
OD_FINDINGS: FULL
OS_FINDINGS: FULL

## 2023-04-05 ASSESSMENT — TONOMETRY
OD_IOP_MMHG: 19
OS_IOP_MMHG: 18

## 2023-06-06 ENCOUNTER — APPOINTMENT (OUTPATIENT)
Dept: INTERNAL MEDICINE | Facility: CLINIC | Age: 58
End: 2023-06-06
Payer: MEDICARE

## 2023-06-06 VITALS
RESPIRATION RATE: 16 BRPM | HEART RATE: 84 BPM | BODY MASS INDEX: 32.78 KG/M2 | OXYGEN SATURATION: 96 % | SYSTOLIC BLOOD PRESSURE: 136 MMHG | WEIGHT: 229 LBS | TEMPERATURE: 97.2 F | HEIGHT: 70 IN | DIASTOLIC BLOOD PRESSURE: 82 MMHG

## 2023-06-06 DIAGNOSIS — R05.9 COUGH, UNSPECIFIED: ICD-10-CM

## 2023-06-06 DIAGNOSIS — Z86.39 PERSONAL HISTORY OF OTHER ENDOCRINE, NUTRITIONAL AND METABOLIC DISEASE: ICD-10-CM

## 2023-06-06 PROCEDURE — 99214 OFFICE O/P EST MOD 30 MIN: CPT

## 2023-06-06 RX ORDER — ALBUTEROL SULFATE 90 UG/1
108 (90 BASE) AEROSOL, METERED RESPIRATORY (INHALATION) 4 TIMES DAILY
Qty: 1 | Refills: 2 | Status: ACTIVE | COMMUNITY
Start: 1900-01-01 | End: 1900-01-01

## 2023-06-06 NOTE — HISTORY OF PRESENT ILLNESS
[FreeTextEntry1] : f/u [de-identified] : 57y M w/ PMhx hypothyroidism, thyroid nodules, asthma, BPH, ALYSIA on CPAP, glaucoma, anemia, CKD, depression, HTN, presenting for f/u.\par \par HTN compliant w/ amlo 10, losartan 100\par \par CKD stable. nephro Dr. Carrasco\par \par anemia of  unknown etiology: mild. has not seen heme/onc as advised.  tiny polyps not cause of anemia as per GI Dr Ontiveros\par \par hypothyroidism/ thyroid nodules: levo 50. endo Dr. Birgit leal. 2 subcm nodules in L hemithyroid from last sono done 8/2022. \par \par asthma: wixela 500/50. he sees pulmonary at [facility]. \par \par BPH last visit was c/o urinary frequency. prostate bx was done and showed stage 2 cancer. he is undergoing cyberknife with Dr. Borja (radiation oncology)- total of 5 sessions every other day. \par \par last visit in february was having radicular neck pain traveling down arm. since lifting weights. MRI shoulder: rotator cuff intact although absent and torn longhead of the biceps tendon noted w/ deg changes. surgery was recommended. he was getting steroid shots to the shoulder and elbow in july and septmeber. was also referred to neurosurgery for the radiculopathy however he states that the tendinitis improved with the steroid shots. both resolved. \par \par knee OA: he had a torn meniscus for which he had surgery for in the past several years ago. right knee. pain returned. states OA was also found on recent imaging.  this is a workers comp case. sees ortho Dr. Hensley in [facility] [place]. \par \par he has been having cough for about 2 weeks. everyday. sometimes gets a little SOB when speaking. he has seasonal allergies and receives allergy shots once a month. he lost weight bc of the diet he was put on after prostate biopsy. lost 17 lb since last visit. was put on this diet by nutritionist from the urologist office. he tries to avoid gassy foods. he eats fruit cocktails, 2 slice of toast, 1 egg, hes only having soy or almond milk now, not cows milk. states his diet has very much changed.

## 2023-06-06 NOTE — ASSESSMENT
[FreeTextEntry1] : Physical Exam:\par Constitutional: No acute distress, well appearing\par HEENT: Normocephalic, atraumatic\par Neck: supple\par Cardiac:  Regular rate and rhythm, No murmurs\par Pulmonary: No respiratory distress, Lungs clear to auscultation bilaterally, no wheezing, rales, or rhonchi\par Abdomen: Soft, non-tender, non-distended, no guarding, normal bowel sounds\par Vascular: No peripheral edema\par Neurology: Coordination grossly intact, no focal deficits\par Psychiatric: Alert and oriented x3, normal mood\par \par \par \par A/P:\par HTN\par  compliant w/ amlo 10, losartan 100\par - f/u 3 months\par \par CKD\par - will check kidney function- script bloodwork provided\par \par anemia\par  of  unknown etiology:\par  mild.\par - encouraged to see heme/onc again- refererral provided again\par \par hypothyroidism/ thyroid nodules: \par - will check tfts today\par - c/w levo 50\par \par asthma: \par -c/w wixela as directed by pulm\par \par BPH/ abnormal prostate\par -c/w treatment as directed by urology and rad onc\par \par neck/shoulder pain\par resolved\par \par knee OA:\par - f/u ortho\par \par cough\par he is requesting cough medication\par ddx includes reflux vs asthma\par - i rec he see his pulmonologist. allergies can be triggering his asthma\par - rec to look out for exacerbating or triggering factors\par - will send benzonatate for now\par - rec to rtc sooner if sx persist or worsen\par

## 2023-06-14 ENCOUNTER — OUTPATIENT (OUTPATIENT)
Dept: OUTPATIENT SERVICES | Facility: HOSPITAL | Age: 58
LOS: 1 days | Discharge: HOME | End: 2023-06-14

## 2023-06-14 DIAGNOSIS — D64.9 ANEMIA, UNSPECIFIED: ICD-10-CM

## 2023-06-16 ENCOUNTER — RESULT REVIEW (OUTPATIENT)
Age: 58
End: 2023-06-16

## 2023-06-16 ENCOUNTER — APPOINTMENT (OUTPATIENT)
Dept: HEMATOLOGY ONCOLOGY | Facility: CLINIC | Age: 58
End: 2023-06-16
Payer: MEDICARE

## 2023-06-16 VITALS
DIASTOLIC BLOOD PRESSURE: 82 MMHG | OXYGEN SATURATION: 95 % | HEIGHT: 70 IN | SYSTOLIC BLOOD PRESSURE: 142 MMHG | TEMPERATURE: 97 F | WEIGHT: 225 LBS | HEART RATE: 73 BPM | BODY MASS INDEX: 32.21 KG/M2

## 2023-06-16 LAB
BASOPHILS # BLD AUTO: 0.1 K/UL — SIGNIFICANT CHANGE UP (ref 0–0.2)
BASOPHILS NFR BLD AUTO: 2.5 % — HIGH (ref 0–2)
EOSINOPHIL # BLD AUTO: 0.2 K/UL — SIGNIFICANT CHANGE UP (ref 0–0.5)
EOSINOPHIL NFR BLD AUTO: 4.3 % — SIGNIFICANT CHANGE UP (ref 0–6)
HCT VFR BLD CALC: 36.8 % — LOW (ref 39–50)
HGB BLD-MCNC: 12.3 G/DL — LOW (ref 13–17)
LYMPHOCYTES # BLD AUTO: 1.3 K/UL — SIGNIFICANT CHANGE UP (ref 1–3.3)
LYMPHOCYTES # BLD AUTO: 27.2 % — SIGNIFICANT CHANGE UP (ref 13–44)
MCHC RBC-ENTMCNC: 32.4 PG — SIGNIFICANT CHANGE UP (ref 27–34)
MCHC RBC-ENTMCNC: 33.4 G/DL — SIGNIFICANT CHANGE UP (ref 32–36)
MCV RBC AUTO: 96.8 FL — SIGNIFICANT CHANGE UP (ref 80–100)
MONOCYTES # BLD AUTO: 0.5 K/UL — SIGNIFICANT CHANGE UP (ref 0–0.9)
MONOCYTES NFR BLD AUTO: 11 % — SIGNIFICANT CHANGE UP (ref 2–14)
NEUTROPHILS # BLD AUTO: 2.6 K/UL — SIGNIFICANT CHANGE UP (ref 1.8–7.4)
NEUTROPHILS NFR BLD AUTO: 55 % — SIGNIFICANT CHANGE UP (ref 43–77)
PLATELET # BLD AUTO: 248 K/UL — SIGNIFICANT CHANGE UP (ref 150–400)
RBC # BLD: 3.8 M/UL — LOW (ref 4.2–5.8)
RBC # FLD: 11.7 % — SIGNIFICANT CHANGE UP (ref 10.3–14.5)
WBC # BLD: 4.6 K/UL — SIGNIFICANT CHANGE UP (ref 3.8–10.5)
WBC # FLD AUTO: 4.6 K/UL — SIGNIFICANT CHANGE UP (ref 3.8–10.5)

## 2023-06-16 PROCEDURE — 99204 OFFICE O/P NEW MOD 45 MIN: CPT

## 2023-06-19 LAB
ALBUMIN SERPL ELPH-MCNC: 4.5 G/DL
ALP BLD-CCNC: 47 U/L
ALT SERPL-CCNC: 9 U/L
ANION GAP SERPL CALC-SCNC: 12 MMOL/L
AST SERPL-CCNC: 10 U/L
BILIRUB SERPL-MCNC: 0.4 MG/DL
BUN SERPL-MCNC: 21 MG/DL
CALCIUM SERPL-MCNC: 9.5 MG/DL
CHLORIDE SERPL-SCNC: 105 MMOL/L
CO2 SERPL-SCNC: 24 MMOL/L
CREAT SERPL-MCNC: 1.44 MG/DL
EGFR: 56 ML/MIN/1.73M2
FERRITIN SERPL-MCNC: 190 NG/ML
FOLATE SERPL-MCNC: 10.7 NG/ML
GLUCOSE SERPL-MCNC: 113 MG/DL
IRON SATN MFR SERPL: 31 %
IRON SERPL-MCNC: 85 UG/DL
POTASSIUM SERPL-SCNC: 4.1 MMOL/L
PROT SERPL-MCNC: 7.1 G/DL
RBC # BLD: 3.73 M/UL
RETICS # AUTO: 1.8 %
RETICS AGGREG/RBC NFR: 65.3 K/UL
SODIUM SERPL-SCNC: 141 MMOL/L
TIBC SERPL-MCNC: 271 UG/DL
UIBC SERPL-MCNC: 186 UG/DL
VIT B12 SERPL-MCNC: 831 PG/ML

## 2023-06-19 NOTE — HISTORY OF PRESENT ILLNESS
[de-identified] : CORWIN BANKS is a 57 year M with PMHX of hypothyroidism, thyroid nodules, asthma, BPH, ALYSIA on CPAP, glaucoma, anemia, CKD, depression, HTN, prostate cancer presents for initial consultation for anemia\par Patient referred by PCP\par \par PMHx: hypothyroidism, thyroid nodules, asthma, BPH, ALYSIA on CPAP, glaucoma, anemia, CKD, depression, HTN\par SHx> Biopsy prostate 2/2023- prostate bx was done and showed stage 2 cancer.\par Family Hx: sister- anemia, mother- anemia\par Social Hx: smokes cigars\par \par \par Previous Labs: \par 6/8/22 WBC 5.71 , Hg 12.2, HCT 37.6 , \par 11/10/21 WBC 5.79 , Hg 12.7, HCT 40.4 , \par \par 9/22/21 Ferritin 144 , Fe serum 102 , Saturation 26%, TIBC 398 , B12 686 , Folate >20\par \par \par \par  [de-identified] : Patient presents for initial visit\par \par Today's CBC: WBC 4.6 , Hg 12.3 , HCT 36.8 , \par \par Not taking B12 or Iron\par Reports ongoing SOB on exertion, likely from asthma \par Admits hematuria while undergoing treatment fro prostate cancer- [facility], completed on 6/14/23\par Denies dizziness, SOB, BRBPR, hematuria, chest pain, palor, melena\par \par Last Colonoscopy: 2015- single sessile 8 mm polyp, 2018- polyp, next due 9/2023 \par Last Endoscopy: None\par Prior Iron Infusion: None\par Prior Blood Transfusion: None

## 2023-06-19 NOTE — ASSESSMENT
[FreeTextEntry1] : CORWIN BANKS is a 57 year M with PMHX of hypothyroidism, thyroid nodules, asthma, BPH, ALYSIA on CPAP, glaucoma, anemia, CKD, depression, htn, prostate cancer presents for initial consultation for anemia\par Patient referred by PCP\par \par PMHx: hypothyroidism, thyroid nodules, asthma, BPH, ALYSIA on CPAP, glaucoma, anemia, CKD, depression, htn\par SHx> Biopsy prostate 2/2023- prostate bx was done and showed stage 2 cancer.\par Family Hx\par Social Hx: smokes cigars\par \par Last Colonoscopy: 2015- single sessile 8 mm polyp, 2018- polyp, next due 9/2023 \par Last Endoscopy: None\par Prior Iron Infusion: None\par Prior Blood Transfusion: None\par \par \par Previous Labs: \par 6/8/22 WBC 5.71 , Hg 12.2, HCT 37.6 , \par 11/10/21 WBC 5.79 , Hg 12.7, HCT 40.4 , \par \par 9/22/21 Ferritin 144 , Fe serum 102 , Saturation 26%, TIBC 398 , B12 686 , Folate >20\par \par \par Today's CBC: WBC 4.6 , Hg 12.3 , HCT 36.8 , \par \par #Anemia\par - anemia appeasr chronic- Hg ~11-12 g/dL  going back 2019 \par - DDX includes: Iron deficiency anemia, B12 deficiency, AOC, MDS. Likely AOC\par - Ordered CBC/CMP/B12/Folate/Iron Profile/ Immunoelectrophoresis \par - Will call patient with lab results. \par - Will continue to monitor CBC, has 3m f/u with PCP \par - Will F/u in 6 months \par \par \par \par # Prostate cancer, ([id number] ) Stage 2\par - PSA on 1/20/23 0-3.7\par - Following [facility]\par - Adenocarcinoma [place] score 3+4\par -  s/p cyberknife with Dr. Borja (radiation oncology)- total of 5 sessions every other day. ( 6/5/23-6/14/23)\par - Continue f/u with Urology and Radiation Oncology  normal...

## 2023-06-20 LAB
ALBUMIN MFR SERPL ELPH: 54 %
ALBUMIN SERPL-MCNC: 3.8 G/DL
ALBUMIN/GLOB SERPL: 1.2 RATIO
ALPHA1 GLOB MFR SERPL ELPH: 4.3 %
ALPHA1 GLOB SERPL ELPH-MCNC: 0.3 G/DL
ALPHA2 GLOB MFR SERPL ELPH: 10.6 %
ALPHA2 GLOB SERPL ELPH-MCNC: 0.8 G/DL
B-GLOBULIN MFR SERPL ELPH: 13.1 %
B-GLOBULIN SERPL ELPH-MCNC: 0.9 G/DL
DEPRECATED KAPPA LC FREE/LAMBDA SER: 1.55 RATIO
GAMMA GLOB FLD ELPH-MCNC: 1.3 G/DL
GAMMA GLOB MFR SERPL ELPH: 18 %
IGA SER QL IEP: 295 MG/DL
IGG SER QL IEP: 1227 MG/DL
IGM SER QL IEP: 68 MG/DL
INTERPRETATION SERPL IEP-IMP: NORMAL
KAPPA LC CSF-MCNC: 1.55 MG/DL
KAPPA LC SERPL-MCNC: 2.4 MG/DL
M PROTEIN SPEC IFE-MCNC: NORMAL
PROT SERPL-MCNC: 7.1 G/DL
PROT SERPL-MCNC: 7.1 G/DL

## 2023-09-13 ENCOUNTER — APPOINTMENT (OUTPATIENT)
Dept: INTERNAL MEDICINE | Facility: CLINIC | Age: 58
End: 2023-09-13
Payer: MEDICARE

## 2023-09-13 ENCOUNTER — OFFICE (OUTPATIENT)
Dept: URBAN - METROPOLITAN AREA CLINIC 114 | Facility: CLINIC | Age: 58
Setting detail: OPHTHALMOLOGY
End: 2023-09-13
Payer: MEDICARE

## 2023-09-13 VITALS
TEMPERATURE: 98.2 F | RESPIRATION RATE: 15 BRPM | SYSTOLIC BLOOD PRESSURE: 130 MMHG | DIASTOLIC BLOOD PRESSURE: 84 MMHG | HEIGHT: 70 IN | WEIGHT: 230 LBS | OXYGEN SATURATION: 97 % | BODY MASS INDEX: 32.93 KG/M2 | HEART RATE: 66 BPM

## 2023-09-13 DIAGNOSIS — H40.1132: ICD-10-CM

## 2023-09-13 DIAGNOSIS — H16.223: ICD-10-CM

## 2023-09-13 DIAGNOSIS — H25.13: ICD-10-CM

## 2023-09-13 PROCEDURE — 92014 COMPRE OPH EXAM EST PT 1/>: CPT | Performed by: SPECIALIST

## 2023-09-13 PROCEDURE — 92133 CPTRZD OPH DX IMG PST SGM ON: CPT | Performed by: SPECIALIST

## 2023-09-13 PROCEDURE — 99214 OFFICE O/P EST MOD 30 MIN: CPT

## 2023-09-13 RX ORDER — MONTELUKAST 10 MG/1
10 TABLET, FILM COATED ORAL
Qty: 90 | Refills: 1 | Status: ACTIVE | COMMUNITY
Start: 1900-01-01 | End: 1900-01-01

## 2023-09-13 ASSESSMENT — VISUAL ACUITY
OD_BCVA: 20/20
OS_BCVA: 20/20

## 2023-09-13 ASSESSMENT — SUPERFICIAL PUNCTATE KERATITIS (SPK)
OS_SPK: T
OD_SPK: T

## 2023-09-13 ASSESSMENT — PACHYMETRY
OS_CT_CORRECTION: 0
OS_CT_UM: 545
OD_CT_UM: 568
OD_CT_CORRECTION: -1

## 2023-09-13 ASSESSMENT — CONFRONTATIONAL VISUAL FIELD TEST (CVF)
OD_FINDINGS: FULL
OS_FINDINGS: FULL

## 2023-09-13 ASSESSMENT — REFRACTION_CURRENTRX
OD_OVR_VA: 20/
OS_OVR_VA: 20/

## 2023-09-13 ASSESSMENT — TONOMETRY
OD_IOP_MMHG: 18
OS_IOP_MMHG: 17

## 2023-10-23 ENCOUNTER — RX RENEWAL (OUTPATIENT)
Age: 58
End: 2023-10-23

## 2023-11-14 RX ORDER — LEVOTHYROXINE SODIUM 0.05 MG/1
50 TABLET ORAL
Qty: 90 | Refills: 1 | Status: ACTIVE | COMMUNITY
Start: 2021-08-13 | End: 1900-01-01

## 2023-11-15 ENCOUNTER — APPOINTMENT (OUTPATIENT)
Dept: GASTROENTEROLOGY | Facility: CLINIC | Age: 58
End: 2023-11-15

## 2023-11-15 ENCOUNTER — TRANSCRIPTION ENCOUNTER (OUTPATIENT)
Age: 58
End: 2023-11-15

## 2023-12-08 ENCOUNTER — OUTPATIENT (OUTPATIENT)
Dept: OUTPATIENT SERVICES | Facility: HOSPITAL | Age: 58
LOS: 1 days | Discharge: HOME | End: 2023-12-08

## 2023-12-08 DIAGNOSIS — D64.9 ANEMIA, UNSPECIFIED: ICD-10-CM

## 2023-12-15 ENCOUNTER — RESULT REVIEW (OUTPATIENT)
Age: 58
End: 2023-12-15

## 2023-12-15 ENCOUNTER — APPOINTMENT (OUTPATIENT)
Dept: HEMATOLOGY ONCOLOGY | Facility: CLINIC | Age: 58
End: 2023-12-15
Payer: MEDICARE

## 2023-12-15 VITALS
OXYGEN SATURATION: 97 % | DIASTOLIC BLOOD PRESSURE: 82 MMHG | TEMPERATURE: 98.1 F | HEIGHT: 70 IN | SYSTOLIC BLOOD PRESSURE: 142 MMHG | BODY MASS INDEX: 33.13 KG/M2 | WEIGHT: 231.44 LBS | HEART RATE: 74 BPM

## 2023-12-15 LAB
BASOPHILS # BLD AUTO: 0.1 K/UL — SIGNIFICANT CHANGE UP (ref 0–0.2)
BASOPHILS # BLD AUTO: 0.1 K/UL — SIGNIFICANT CHANGE UP (ref 0–0.2)
BASOPHILS NFR BLD AUTO: 2.2 % — HIGH (ref 0–2)
BASOPHILS NFR BLD AUTO: 2.2 % — HIGH (ref 0–2)
EOSINOPHIL # BLD AUTO: 0.3 K/UL — SIGNIFICANT CHANGE UP (ref 0–0.5)
EOSINOPHIL # BLD AUTO: 0.3 K/UL — SIGNIFICANT CHANGE UP (ref 0–0.5)
EOSINOPHIL NFR BLD AUTO: 4.6 % — SIGNIFICANT CHANGE UP (ref 0–6)
EOSINOPHIL NFR BLD AUTO: 4.6 % — SIGNIFICANT CHANGE UP (ref 0–6)
HCT VFR BLD CALC: 33.4 % — LOW (ref 39–50)
HCT VFR BLD CALC: 33.4 % — LOW (ref 39–50)
HGB BLD-MCNC: 10.5 G/DL — LOW (ref 13–17)
HGB BLD-MCNC: 10.5 G/DL — LOW (ref 13–17)
LYMPHOCYTES # BLD AUTO: 1.7 K/UL — SIGNIFICANT CHANGE UP (ref 1–3.3)
LYMPHOCYTES # BLD AUTO: 1.7 K/UL — SIGNIFICANT CHANGE UP (ref 1–3.3)
LYMPHOCYTES # BLD AUTO: 28.9 % — SIGNIFICANT CHANGE UP (ref 13–44)
LYMPHOCYTES # BLD AUTO: 28.9 % — SIGNIFICANT CHANGE UP (ref 13–44)
MCHC RBC-ENTMCNC: 31.4 G/DL — LOW (ref 32–36)
MCHC RBC-ENTMCNC: 31.4 G/DL — LOW (ref 32–36)
MCHC RBC-ENTMCNC: 32.1 PG — SIGNIFICANT CHANGE UP (ref 27–34)
MCHC RBC-ENTMCNC: 32.1 PG — SIGNIFICANT CHANGE UP (ref 27–34)
MCV RBC AUTO: 102.3 FL — HIGH (ref 80–100)
MCV RBC AUTO: 102.3 FL — HIGH (ref 80–100)
MONOCYTES # BLD AUTO: 0.6 K/UL — SIGNIFICANT CHANGE UP (ref 0–0.9)
MONOCYTES # BLD AUTO: 0.6 K/UL — SIGNIFICANT CHANGE UP (ref 0–0.9)
MONOCYTES NFR BLD AUTO: 10 % — SIGNIFICANT CHANGE UP (ref 2–14)
MONOCYTES NFR BLD AUTO: 10 % — SIGNIFICANT CHANGE UP (ref 2–14)
NEUTROPHILS # BLD AUTO: 3.2 K/UL — SIGNIFICANT CHANGE UP (ref 1.8–7.4)
NEUTROPHILS # BLD AUTO: 3.2 K/UL — SIGNIFICANT CHANGE UP (ref 1.8–7.4)
NEUTROPHILS NFR BLD AUTO: 54.3 % — SIGNIFICANT CHANGE UP (ref 43–77)
NEUTROPHILS NFR BLD AUTO: 54.3 % — SIGNIFICANT CHANGE UP (ref 43–77)
PLATELET # BLD AUTO: 263 K/UL — SIGNIFICANT CHANGE UP (ref 150–400)
PLATELET # BLD AUTO: 263 K/UL — SIGNIFICANT CHANGE UP (ref 150–400)
RBC # BLD: 3.27 M/UL — LOW (ref 4.2–5.8)
RBC # BLD: 3.27 M/UL — LOW (ref 4.2–5.8)
RBC # FLD: 11.2 % — SIGNIFICANT CHANGE UP (ref 10.3–14.5)
RBC # FLD: 11.2 % — SIGNIFICANT CHANGE UP (ref 10.3–14.5)
WBC # BLD: 6 K/UL — SIGNIFICANT CHANGE UP (ref 3.8–10.5)
WBC # BLD: 6 K/UL — SIGNIFICANT CHANGE UP (ref 3.8–10.5)
WBC # FLD AUTO: 6 K/UL — SIGNIFICANT CHANGE UP (ref 3.8–10.5)
WBC # FLD AUTO: 6 K/UL — SIGNIFICANT CHANGE UP (ref 3.8–10.5)

## 2023-12-15 PROCEDURE — 99214 OFFICE O/P EST MOD 30 MIN: CPT

## 2023-12-15 NOTE — HISTORY OF PRESENT ILLNESS
[de-identified] : CORWIN BANKS is a 57 year M with PMHX of hypothyroidism, thyroid nodules, asthma, BPH, ALYSIA on CPAP, glaucoma, anemia, CKD, depression, HTN, prostate cancer presents for initial consultation for anemia\par  Patient referred by PCP\par  \par  PMHx: hypothyroidism, thyroid nodules, asthma, BPH, ALYSIA on CPAP, glaucoma, anemia, CKD, depression, HTN\par  SHx> Biopsy prostate 2/2023- prostate bx was done and showed stage 2 cancer.\par  Family Hx: sister- anemia, mother- anemia\par  Social Hx: smokes cigars\par  \par  \par  Previous Labs: \par  6/8/22 WBC 5.71 , Hg 12.2, HCT 37.6 , \par  11/10/21 WBC 5.79 , Hg 12.7, HCT 40.4 , \par  \par  9/22/21 Ferritin 144 , Fe serum 102 , Saturation 26%, TIBC 398 , B12 686 , Folate >20\par  \par  \par  \par   [de-identified] : Patient presents for follow up  6/16/23 CBC: WBC 4.6 , Hg 12.3 , HCT 36.8 ,  Today's CBC: WBC 6.0, Hg 10.5 , HCT 33.4 , plt 263  Not taking B12 or Iron s/p Right knee arthroscope on 11/14/23 Denies dizziness, SOB, BRBPR, hematuria, chest pain, palor, melena  Last Colonoscopy: 2015- single sessile 8 mm polyp, 2018- polyp, next due 9/2023  Last Endoscopy: None Prior Iron Infusion: None Prior Blood Transfusion: None

## 2023-12-15 NOTE — ASSESSMENT
[FreeTextEntry1] : CORWIN BANKS is a 57 year M with PMHX of hypothyroidism, thyroid nodules, asthma, BPH, ALYSIA on CPAP, glaucoma, anemia, CKD, depression, htn, prostate cancer presents for initial consultation for anemia  Patient referred by PCP  PMHx: hypothyroidism, thyroid nodules, asthma, BPH, ALYSIA on CPAP, glaucoma, anemia, CKD, depression, htn SHx> Biopsy prostate 2/2023- prostate bx was done and showed stage 2 cancer.  Last Colonoscopy: 2015- single sessile 8 mm polyp, 2018- polyp, next due 9/2023 Last Endoscopy: None Prior Iron Infusion: None Prior Blood Transfusion: None  Previous Labs: 6/8/22 WBC 5.71 , Hg 12.2, HCT 37.6 ,  11/10/21 WBC 5.79 , Hg 12.7, HCT 40.4 ,  9/22/21 Ferritin 144 , Fe serum 102 , Saturation 26%, TIBC 398 , B12 686 , Folate >20  6/16/23 CBC: WBC 4.6 , Hg 12.3 , HCT 36.8 ,  Today's CBC: WBC 6.0, Hg 10.5 , HCT 33.4 , plt 263   #Anemia - anemia appears chronic- Hg ~11-12 g/dL going back 2019 - DDX includes: Iron deficiency anemia, B12 deficiency, AOC, MDS. Likely AOC - Hg today 10.5, Ordered CBC/CMP/B12/Folate/Iron Profile - Will call patient with lab results. - Patient has f/u with GI scheduled Feb 2024 - Repeat labs in 6 weeks. F/U in 3 months   # Prostate cancer, ([id number] ) Stage 2 - PSA on 1/20/23 0-3.7 - Following [facility] - Adenocarcinoma [place] score 3+4 - s/p cyberknife with Dr. Borja (radiation oncology)- total of 5 sessions every other day. ( 6/5/23-6/14/23) - Continue f/u with Urology and Radiation Oncology.

## 2023-12-18 LAB
ALBUMIN SERPL ELPH-MCNC: 4.2 G/DL
ALP BLD-CCNC: 46 U/L
ALT SERPL-CCNC: 7 U/L
ANION GAP SERPL CALC-SCNC: 10 MMOL/L
AST SERPL-CCNC: 10 U/L
BILIRUB SERPL-MCNC: 0.4 MG/DL
BUN SERPL-MCNC: 19 MG/DL
CALCIUM SERPL-MCNC: 10 MG/DL
CHLORIDE SERPL-SCNC: 105 MMOL/L
CO2 SERPL-SCNC: 26 MMOL/L
CREAT SERPL-MCNC: 1.59 MG/DL
EGFR: 50 ML/MIN/1.73M2
FERRITIN SERPL-MCNC: 210 NG/ML
FOLATE SERPL-MCNC: 11.3 NG/ML
GLUCOSE SERPL-MCNC: 92 MG/DL
IRON SATN MFR SERPL: 34 %
IRON SERPL-MCNC: 85 UG/DL
POTASSIUM SERPL-SCNC: 4.3 MMOL/L
PROT SERPL-MCNC: 6.7 G/DL
SODIUM SERPL-SCNC: 141 MMOL/L
TIBC SERPL-MCNC: 250 UG/DL
UIBC SERPL-MCNC: 166 UG/DL
VIT B12 SERPL-MCNC: >2000 PG/ML

## 2023-12-20 ENCOUNTER — APPOINTMENT (OUTPATIENT)
Dept: INTERNAL MEDICINE | Facility: CLINIC | Age: 58
End: 2023-12-20
Payer: MEDICARE

## 2023-12-20 VITALS
BODY MASS INDEX: 32.64 KG/M2 | WEIGHT: 228 LBS | OXYGEN SATURATION: 96 % | HEART RATE: 79 BPM | RESPIRATION RATE: 16 BRPM | DIASTOLIC BLOOD PRESSURE: 80 MMHG | HEIGHT: 70 IN | SYSTOLIC BLOOD PRESSURE: 130 MMHG | TEMPERATURE: 98.2 F

## 2023-12-20 DIAGNOSIS — M25.561 PAIN IN RIGHT KNEE: ICD-10-CM

## 2023-12-20 DIAGNOSIS — G62.9 POLYNEUROPATHY, UNSPECIFIED: ICD-10-CM

## 2023-12-20 PROCEDURE — 99214 OFFICE O/P EST MOD 30 MIN: CPT

## 2023-12-20 RX ORDER — LEVOCETIRIZINE DIHYDROCHLORIDE 5 MG/1
5 TABLET ORAL DAILY
Qty: 90 | Refills: 0 | Status: ACTIVE | COMMUNITY

## 2023-12-20 RX ORDER — GABAPENTIN 300 MG/1
300 CAPSULE ORAL
Qty: 180 | Refills: 0 | Status: ACTIVE | COMMUNITY
Start: 2023-02-15 | End: 1900-01-01

## 2023-12-20 RX ORDER — FLUTICASONE FUROATE, UMECLIDINIUM BROMIDE AND VILANTEROL TRIFENATATE 200; 62.5; 25 UG/1; UG/1; UG/1
200-62.5-25 POWDER RESPIRATORY (INHALATION)
Qty: 1 | Refills: 0 | Status: DISCONTINUED | COMMUNITY
Start: 2023-09-13 | End: 2023-12-20

## 2023-12-20 RX ORDER — FLUTICASONE PROPIONATE AND SALMETEROL 500; 50 UG/1; UG/1
500-50 POWDER RESPIRATORY (INHALATION) TWICE DAILY
Refills: 0 | Status: ACTIVE | COMMUNITY
Start: 2023-02-15

## 2023-12-20 RX ORDER — BENZONATATE 200 MG/1
200 CAPSULE ORAL
Qty: 42 | Refills: 0 | Status: DISCONTINUED | COMMUNITY
Start: 2023-06-06 | End: 2023-12-20

## 2023-12-20 RX ORDER — LATANOPROST/PF 0.005 %
0.01 DROPS OPHTHALMIC (EYE)
Qty: 1 | Refills: 2 | Status: ACTIVE | COMMUNITY

## 2023-12-20 RX ORDER — AMLODIPINE BESYLATE 10 MG/1
10 TABLET ORAL DAILY
Qty: 90 | Refills: 1 | Status: ACTIVE | COMMUNITY
Start: 2021-05-19 | End: 1900-01-01

## 2023-12-20 RX ORDER — LOSARTAN POTASSIUM 100 MG/1
100 TABLET, FILM COATED ORAL DAILY
Qty: 90 | Refills: 1 | Status: ACTIVE | COMMUNITY
Start: 2021-11-10 | End: 1900-01-01

## 2023-12-20 NOTE — HISTORY OF PRESENT ILLNESS
[FreeTextEntry1] : F/U [de-identified] : CORWIN BANKS is a 58 year old male with PMHx hypothyroidism, thyroid nodules, asthma, BPH, ALYSIA on CPAP, glaucoma, anemia, CKD, depression, HTN, prostate cancer 02/2023, presenting for f/u.

## 2023-12-20 NOTE — ASSESSMENT
[FreeTextEntry1] : Physical Exam: Constitutional: No acute distress, well appearing HEENT: Normocephalic, atraumatic Neck: supple Cardiac: Regular rate and rhythm, No murmurs Pulmonary: No respiratory distress, Lungs clear to auscultation bilaterally, no wheezing, rales, or rhonchi Abdomen: Soft, non-tender, non-distended, no guarding, normal bowel sounds Vascular: No peripheral edema Neurology: Coordination grossly intact, no focal deficits Psychiatric: Alert and oriented x3, normal mood    A/P: HTN on amlo 10, losartan 100 controlled - c/w current meds - f/u 6 months or sooner as needed   CKD bun/cr 19/ 1.59, GR 50- reviewed from recent labs 12/15 nephro Dr. Carrasco. states he has not followed up in some time - rec he f/u with nephrology  anemia chronic hb/hct reviewed from 12/15 w/ heme/onc 10.5/ 33.4 noted normal iron levels and elevated b12.   tiny polyps not cause of anemia as per GI Dr Ontiveros saw heme/onc Dr. Leonardo 6/2023- undergoing workup. likely anemia of chronic disease. noted recent drop in hb/hct likely post surgery- he is repeating next month with heme/onc, has appt scheduled  hypothyroidism/ thyroid nodules on levothyroxine 50mcg  seeing new endo now at same practice, Dr. khan. Dr. Genao moved. states he missed his appt yesterday and got rescheduled to february last sono from 8/2022 showed 2 subcm nodules in L hemithyroid, rec he f/u with endo TFT checked november wnl - c/w levo 50  asthma: follows w/ pulm at [facility] states he tried trelegy but he didnt like how he felt on it. states it was also very expensive.  he is back on wixela, takes it bid - c/w pulm f/u  BPH/ prostate CA prostate bx showed stage 2 cancer- 2/2023 underwent cyberknife with Dr. Broja (radiation oncology) -c/w treatment as directed by urology Dr Elijah Harris and rad onc Dr. Borja - states most recent PSA level decreased to 2  knee OA: he is s/p surgery in  nov with Dr Cohen ortho/ workers comp case- surg Dr Cohen - getting PT also on perocet from pain doctor from workers comp- Dr Hensley.  ISTOP checked. Reference #: [id number] -continue f/u with ortho and pain management  HCM: - he is due for colonoscopy. has appt scheduled with Dr. Jo- rescheduled   neck/ shoulder pain] states he used to take gabapentin from his neurologist in [place], Dr. Segundo? for neuropathy.  however states that the pain started again and he doesnt have refills.  - will send refill for gabapentin - rec he f/u with neurologist

## 2024-01-24 ENCOUNTER — OFFICE (OUTPATIENT)
Dept: URBAN - METROPOLITAN AREA CLINIC 114 | Facility: CLINIC | Age: 59
Setting detail: OPHTHALMOLOGY
End: 2024-01-24
Payer: MEDICARE

## 2024-01-24 DIAGNOSIS — H25.13: ICD-10-CM

## 2024-01-24 DIAGNOSIS — H16.223: ICD-10-CM

## 2024-01-24 DIAGNOSIS — H40.1132: ICD-10-CM

## 2024-01-24 PROCEDURE — 92250 FUNDUS PHOTOGRAPHY W/I&R: CPT | Performed by: SPECIALIST

## 2024-01-24 PROCEDURE — 99213 OFFICE O/P EST LOW 20 MIN: CPT | Performed by: SPECIALIST

## 2024-01-24 ASSESSMENT — SUPERFICIAL PUNCTATE KERATITIS (SPK)
OD_SPK: T
OS_SPK: T

## 2024-01-24 ASSESSMENT — CONFRONTATIONAL VISUAL FIELD TEST (CVF)
OS_FINDINGS: FULL
OD_FINDINGS: FULL

## 2024-01-26 ENCOUNTER — APPOINTMENT (OUTPATIENT)
Dept: HEMATOLOGY ONCOLOGY | Facility: CLINIC | Age: 59
End: 2024-01-26

## 2024-02-06 ENCOUNTER — APPOINTMENT (OUTPATIENT)
Dept: GASTROENTEROLOGY | Facility: CLINIC | Age: 59
End: 2024-02-06
Payer: MEDICARE

## 2024-02-06 VITALS
WEIGHT: 235 LBS | DIASTOLIC BLOOD PRESSURE: 74 MMHG | RESPIRATION RATE: 14 BRPM | HEART RATE: 84 BPM | SYSTOLIC BLOOD PRESSURE: 146 MMHG | HEIGHT: 70 IN | BODY MASS INDEX: 33.64 KG/M2 | OXYGEN SATURATION: 98 %

## 2024-02-06 DIAGNOSIS — J45.20 MILD INTERMITTENT ASTHMA, UNCOMPLICATED: ICD-10-CM

## 2024-02-06 DIAGNOSIS — Z86.39 PERSONAL HISTORY OF OTHER ENDOCRINE, NUTRITIONAL AND METABOLIC DISEASE: ICD-10-CM

## 2024-02-06 DIAGNOSIS — Z85.46 PERSONAL HISTORY OF MALIGNANT NEOPLASM OF PROSTATE: ICD-10-CM

## 2024-02-06 DIAGNOSIS — E66.9 OBESITY, UNSPECIFIED: ICD-10-CM

## 2024-02-06 DIAGNOSIS — Z86.79 PERSONAL HISTORY OF OTHER DISEASES OF THE CIRCULATORY SYSTEM: ICD-10-CM

## 2024-02-06 PROCEDURE — 99214 OFFICE O/P EST MOD 30 MIN: CPT

## 2024-02-06 RX ORDER — POLYETHYLENE GLYCOL-3350 AND ELECTROLYTES WITH FLAVOR PACK 240; 5.84; 2.98; 6.72; 22.72 G/278.26G; G/278.26G; G/278.26G; G/278.26G; G/278.26G
240 POWDER, FOR SOLUTION ORAL
Qty: 4000 | Refills: 0 | Status: ACTIVE | COMMUNITY
Start: 2024-02-06 | End: 1900-01-01

## 2024-02-06 NOTE — HISTORY OF PRESENT ILLNESS
[FreeTextEntry1] : 56-year-old -American male with history of hypertension asthma obesity depression and personal history of colon polyps.  Patient has had multiple prior colonoscopies in the past.  Last being 3.5 years ago.  His initial colonoscopy was in 2009 with Dr. Chandler Salgado in [place] and was negative. A subsequent colonoscopy in 2015 revealed an 8 mm polyp.  Pathology not available.  A surveillance colonoscopy done at [facility] in 8/18 by me showed diverticulosis and a tiny rectal polyp which on pathology was hyperplastic and internal hemorrhoids.  Patient had been advised a 5-year interval for next colonoscopy.    He remains asymptomatic from a GI point of view.  He is having 1-2 formed easy bowel movements per day.  No rectal bleeding or abdominal pain or weight loss or alteration in pattern of bowel movements.  No upper GI symptomatology. Review of blood work indicates hemoglobins to run anywhere from high 11's to high 12 range with MCV of about 103.  Prior iron panels and B12 and folate were all normal.  Etiology for is mild anemia, chronic over 4 years is unexplained.  Awaits hematology evaluation. No new medical issues.

## 2024-02-06 NOTE — PHYSICAL EXAM
[Alert] : alert [Normal Voice/Communication] : normal voice/communication [Healthy Appearing] : healthy appearing [No Acute Distress] : no acute distress [Sclera] : the sclera and conjunctiva were normal [Hearing Threshold Finger Rub Not Appling] : hearing was normal [Normal Lips/Gums] : the lips and gums were normal [Oropharynx] : the oropharynx was normal [Normal Appearance] : the appearance of the neck was normal [No Neck Mass] : no neck mass was observed [No Respiratory Distress] : no respiratory distress [No Acc Muscle Use] : no accessory muscle use [Respiration, Rhythm And Depth] : normal respiratory rhythm and effort [Auscultation Breath Sounds / Voice Sounds] : lungs were clear to auscultation bilaterally [Heart Rate And Rhythm] : heart rate was normal and rhythm regular [Normal S1, S2] : normal S1 and S2 [Murmurs] : no murmurs [None] : no edema [Bowel Sounds] : normal bowel sounds [Abdomen Tenderness] : non-tender [No Masses] : no abdominal mass palpated [Abdomen Soft] : soft [] : no hepatosplenomegaly [Oriented To Time, Place, And Person] : oriented to person, place, and time [de-identified] : Large frame and build.  No acute distress. [FreeTextEntry1] : 20 sclera. [de-identified] : No pharyngitis.  No adenopathy. [de-identified] : No neck masses or adenopathy. [de-identified] : Clear to A&P. [de-identified] : Without MRG. [de-identified] : No CCE. [de-identified] : Flat soft bowel sounds present.  No organomegaly.  No mass tenderness or rebound. [de-identified] : Deferred today. [de-identified] : Normal. [de-identified] : Normal. [de-identified] : Normal. [de-identified] : Normal.

## 2024-02-06 NOTE — REASON FOR VISIT
[Follow-up] : a follow-up of an existing diagnosis [FreeTextEntry1] : Personal history of colon polyp.  Consider for polyp surveillance colonoscopy.

## 2024-02-06 NOTE — ASSESSMENT
[FreeTextEntry1] : Resident history of colon polyp dating back to 2015.  No pathology available for 8 mm polyp removed at that time.  Subsequent surveillance colonoscopy in 2018 was negative except for internal hemorrhoids.  Remains asymptomatic from lower GI point of view.  Has chronic anemia of unknown etiology.  Normal iron studies.  Hemoglobin runs in the 11 range.  Family history negative for colorectal neoplasia.  No active complaints. Remains a reasonable candidate for a polyp surveillance colonoscopy.  The procedure, its risk benefits and prep, were explained to the patient, who understands and is agreeable to proceed.  ASA #3.  Patient appears to be in optimal medical condition to undergo planned procedure.  Mallampati #2.  Gavel light prep to be utilized.  Repeat blood work not necessary.  No clearances required.  Arrangements made.  Results to follow.  Advised to continue blood pressure medications daily through the procedure.  Use inhalers as needed on a.m. of procedure.

## 2024-02-07 ENCOUNTER — NON-APPOINTMENT (OUTPATIENT)
Age: 59
End: 2024-02-07

## 2024-03-06 ENCOUNTER — OFFICE (OUTPATIENT)
Dept: URBAN - METROPOLITAN AREA CLINIC 114 | Facility: CLINIC | Age: 59
Setting detail: OPHTHALMOLOGY
End: 2024-03-06
Payer: MEDICARE

## 2024-03-06 DIAGNOSIS — H40.1132: ICD-10-CM

## 2024-03-06 DIAGNOSIS — H16.223: ICD-10-CM

## 2024-03-06 DIAGNOSIS — H25.13: ICD-10-CM

## 2024-03-06 PROCEDURE — 99213 OFFICE O/P EST LOW 20 MIN: CPT | Performed by: SPECIALIST

## 2024-03-08 ENCOUNTER — OUTPATIENT (OUTPATIENT)
Dept: OUTPATIENT SERVICES | Facility: HOSPITAL | Age: 59
LOS: 1 days | Discharge: HOME | End: 2024-03-08

## 2024-03-08 DIAGNOSIS — D64.9 ANEMIA, UNSPECIFIED: ICD-10-CM

## 2024-03-15 ENCOUNTER — APPOINTMENT (OUTPATIENT)
Dept: HEMATOLOGY ONCOLOGY | Facility: CLINIC | Age: 59
End: 2024-03-15
Payer: MEDICARE

## 2024-03-15 VITALS
TEMPERATURE: 97.9 F | HEART RATE: 75 BPM | DIASTOLIC BLOOD PRESSURE: 81 MMHG | SYSTOLIC BLOOD PRESSURE: 132 MMHG | OXYGEN SATURATION: 95 %

## 2024-03-15 PROCEDURE — 99213 OFFICE O/P EST LOW 20 MIN: CPT

## 2024-03-20 LAB
ALBUMIN SERPL ELPH-MCNC: 4.4 G/DL
ALP BLD-CCNC: 51 U/L
ALT SERPL-CCNC: 13 U/L
ANION GAP SERPL CALC-SCNC: 10 MMOL/L
AST SERPL-CCNC: 16 U/L
BILIRUB SERPL-MCNC: 0.3 MG/DL
BUN SERPL-MCNC: 19 MG/DL
CALCIUM SERPL-MCNC: 9.5 MG/DL
CHLORIDE SERPL-SCNC: 104 MMOL/L
CO2 SERPL-SCNC: 27 MMOL/L
CREAT SERPL-MCNC: 1.58 MG/DL
EGFR: 50 ML/MIN/1.73M2
FERRITIN SERPL-MCNC: 158 NG/ML
FOLATE SERPL-MCNC: 17.2 NG/ML
GLUCOSE SERPL-MCNC: 117 MG/DL
IRON SATN MFR SERPL: 30 %
IRON SERPL-MCNC: 86 UG/DL
POTASSIUM SERPL-SCNC: 4.5 MMOL/L
PROT SERPL-MCNC: 7.2 G/DL
SODIUM SERPL-SCNC: 141 MMOL/L
TIBC SERPL-MCNC: 290 UG/DL
UIBC SERPL-MCNC: 204 UG/DL
VIT B12 SERPL-MCNC: >2000 PG/ML

## 2024-03-22 ENCOUNTER — RESULT REVIEW (OUTPATIENT)
Age: 59
End: 2024-03-22

## 2024-03-22 ENCOUNTER — APPOINTMENT (OUTPATIENT)
Dept: HEMATOLOGY ONCOLOGY | Facility: CLINIC | Age: 59
End: 2024-03-22

## 2024-03-22 LAB
BASOPHILS # BLD AUTO: 0.2 K/UL — SIGNIFICANT CHANGE UP (ref 0–0.2)
BASOPHILS NFR BLD AUTO: 2.8 % — HIGH (ref 0–2)
EOSINOPHIL # BLD AUTO: 0.3 K/UL — SIGNIFICANT CHANGE UP (ref 0–0.5)
EOSINOPHIL NFR BLD AUTO: 5.5 % — SIGNIFICANT CHANGE UP (ref 0–6)
HCT VFR BLD CALC: 35.3 % — LOW (ref 39–50)
HGB BLD-MCNC: 11.9 G/DL — LOW (ref 13–17)
LYMPHOCYTES # BLD AUTO: 2 K/UL — SIGNIFICANT CHANGE UP (ref 1–3.3)
LYMPHOCYTES # BLD AUTO: 35.8 % — SIGNIFICANT CHANGE UP (ref 13–44)
MCHC RBC-ENTMCNC: 33.2 PG — SIGNIFICANT CHANGE UP (ref 27–34)
MCHC RBC-ENTMCNC: 33.7 G/DL — SIGNIFICANT CHANGE UP (ref 32–36)
MCV RBC AUTO: 98.6 FL — SIGNIFICANT CHANGE UP (ref 80–100)
MONOCYTES # BLD AUTO: 0.5 K/UL — SIGNIFICANT CHANGE UP (ref 0–0.9)
MONOCYTES NFR BLD AUTO: 8.9 % — SIGNIFICANT CHANGE UP (ref 2–14)
NEUTROPHILS # BLD AUTO: 2.7 K/UL — SIGNIFICANT CHANGE UP (ref 1.8–7.4)
NEUTROPHILS NFR BLD AUTO: 47 % — SIGNIFICANT CHANGE UP (ref 43–77)
PLATELET # BLD AUTO: 230 K/UL — SIGNIFICANT CHANGE UP (ref 150–400)
RBC # BLD: 3.58 M/UL — LOW (ref 4.2–5.8)
RBC # FLD: 10.9 % — SIGNIFICANT CHANGE UP (ref 10.3–14.5)
WBC # BLD: 5.7 K/UL — SIGNIFICANT CHANGE UP (ref 3.8–10.5)
WBC # FLD AUTO: 5.7 K/UL — SIGNIFICANT CHANGE UP (ref 3.8–10.5)

## 2024-03-25 LAB
ALBUMIN MFR SERPL ELPH: 58.6 %
ALBUMIN SERPL-MCNC: 4.2 G/DL
ALBUMIN/GLOB SERPL: 1.4 RATIO
ALPHA1 GLOB MFR SERPL ELPH: 3.5 %
ALPHA1 GLOB SERPL ELPH-MCNC: 0.3 G/DL
ALPHA2 GLOB MFR SERPL ELPH: 8.7 %
ALPHA2 GLOB SERPL ELPH-MCNC: 0.6 G/DL
B-GLOBULIN MFR SERPL ELPH: 12 %
B-GLOBULIN SERPL ELPH-MCNC: 0.9 G/DL
DEPRECATED KAPPA LC FREE/LAMBDA SER: 1.66 RATIO
GAMMA GLOB FLD ELPH-MCNC: 1.2 G/DL
GAMMA GLOB MFR SERPL ELPH: 17.2 %
IGA SER QL IEP: 277 MG/DL
IGG SER QL IEP: 1217 MG/DL
IGM SER QL IEP: 68 MG/DL
INTERPRETATION SERPL IEP-IMP: NORMAL
KAPPA LC CSF-MCNC: 1.53 MG/DL
KAPPA LC SERPL-MCNC: 2.54 MG/DL
M PROTEIN SPEC IFE-MCNC: NORMAL
PROT SERPL-MCNC: 7.2 G/DL
PROT SERPL-MCNC: 7.2 G/DL

## 2024-03-25 NOTE — HISTORY OF PRESENT ILLNESS
[de-identified] : CORWIN BANKS is a 57 year M with PMHX of hypothyroidism, thyroid nodules, asthma, BPH, ALYSIA on CPAP, glaucoma, anemia, CKD, depression, HTN, prostate cancer presents for initial consultation for anemia Patient referred by PCP  PMHx: hypothyroidism, thyroid nodules, asthma, BPH, ALYSIA on CPAP, glaucoma, anemia, CKD, depression, HTN SHx> Biopsy prostate 2/2023- prostate bx was done and showed stage 2 cancer. Family Hx: sister- anemia, mother- anemia Social Hx: smokes cigars   Previous Labs: 6/8/22 WBC 5.71 , Hg 12.2, HCT 37.6 ,  11/10/21 WBC 5.79 , Hg 12.7, HCT 40.4 ,   9/22/21 Ferritin 144 , Fe serum 102 , Saturation 26%, TIBC 398 , B12 686 , Folate >20.       Interval History: Patient presents for follow up  6/16/23 CBC: WBC 4.6 , Hg 12.3 , HCT 36.8 ,  Today's CBC: WBC 6.0, Hg 10.5 , HCT 33.4 , plt 263  Not taking B12 or Iron s/p Right knee arthroscope on 11/14/23 Denies dizziness, SOB, BRBPR, hematuria, chest pain, palor, melena  Last Colonoscopy: 2015- single sessile 8 mm polyp, 2018- polyp, next due 9/2023 Last Endoscopy: None Prior Iron Infusion: None Prior Blood Transfusion: None.  Interval: Downtrending H/H. Denies GI//mucosal bleeding. No tarry stools. Pending colonoscopy. Endorses some fatigue but denies weight loss, lightheadedness, weakness.

## 2024-03-25 NOTE — ASSESSMENT
[FreeTextEntry1] : CORWIN BANKS is a 58 year M with PMHX of hypothyroidism, thyroid nodules, asthma, BPH, ALYSIA on CPAP, glaucoma, anemia, CKD, depression, htn, prostate cancer presents for initial consultation for anemia   #Anemia - anemia appears chronic- Hg ~11-12 g/dL going back 2019 - DDX includes: Iron deficiency anemia, B12 deficiency, AOC, MDS. Likely AOC from renal disease, previous radiation treatments - Ordered CBC/CMP/B12/Folate/Iron Profile - Will call patient with lab results. - Patient has f/u with GI scheduled May 2024  # Prostate cancer, ([id number] ) Stage 2 - PSA on 1/20/23 0-3.7 - [facility] - Adenocarcinoma [place] score 3+4 - s/p cyberknife with Dr. Borja (radiation oncology)- total of 5 sessions every other day. ( 6/5/23-6/14/23) - Continue f/u with Urology and Radiation Oncology.

## 2024-04-03 ENCOUNTER — OFFICE (OUTPATIENT)
Dept: URBAN - METROPOLITAN AREA CLINIC 114 | Facility: CLINIC | Age: 59
Setting detail: OPHTHALMOLOGY
End: 2024-04-03
Payer: MEDICARE

## 2024-04-03 ENCOUNTER — RX ONLY (RX ONLY)
Age: 59
End: 2024-04-03

## 2024-04-03 DIAGNOSIS — H25.13: ICD-10-CM

## 2024-04-03 DIAGNOSIS — H40.1132: ICD-10-CM

## 2024-04-03 DIAGNOSIS — H16.223: ICD-10-CM

## 2024-04-03 PROCEDURE — 99213 OFFICE O/P EST LOW 20 MIN: CPT | Performed by: SPECIALIST

## 2024-04-03 PROCEDURE — 92020 GONIOSCOPY: CPT | Performed by: SPECIALIST

## 2024-05-07 ENCOUNTER — TRANSCRIPTION ENCOUNTER (OUTPATIENT)
Age: 59
End: 2024-05-07

## 2024-05-07 ENCOUNTER — APPOINTMENT (OUTPATIENT)
Dept: INTERNAL MEDICINE | Facility: CLINIC | Age: 59
End: 2024-05-07
Payer: MEDICARE

## 2024-05-07 ENCOUNTER — APPOINTMENT (OUTPATIENT)
Dept: INTERNAL MEDICINE | Facility: CLINIC | Age: 59
End: 2024-05-07

## 2024-05-07 VITALS
SYSTOLIC BLOOD PRESSURE: 126 MMHG | HEART RATE: 72 BPM | DIASTOLIC BLOOD PRESSURE: 74 MMHG | HEIGHT: 70 IN | BODY MASS INDEX: 33.64 KG/M2 | TEMPERATURE: 97.3 F | OXYGEN SATURATION: 98 % | WEIGHT: 235 LBS

## 2024-05-07 DIAGNOSIS — G47.33 OBSTRUCTIVE SLEEP APNEA (ADULT) (PEDIATRIC): ICD-10-CM

## 2024-05-07 DIAGNOSIS — Z86.010 PERSONAL HISTORY OF COLONIC POLYPS: ICD-10-CM

## 2024-05-07 DIAGNOSIS — I10 ESSENTIAL (PRIMARY) HYPERTENSION: ICD-10-CM

## 2024-05-07 DIAGNOSIS — M54.50 LOW BACK PAIN, UNSPECIFIED: ICD-10-CM

## 2024-05-07 DIAGNOSIS — Z00.00 ENCOUNTER FOR GENERAL ADULT MEDICAL EXAMINATION W/OUT ABNORMAL FINDINGS: ICD-10-CM

## 2024-05-07 DIAGNOSIS — N40.0 BENIGN PROSTATIC HYPERPLASIA WITHOUT LOWER URINARY TRACT SYMPMS: ICD-10-CM

## 2024-05-07 DIAGNOSIS — N18.9 CHRONIC KIDNEY DISEASE, UNSPECIFIED: ICD-10-CM

## 2024-05-07 DIAGNOSIS — J45.909 UNSPECIFIED ASTHMA, UNCOMPLICATED: ICD-10-CM

## 2024-05-07 DIAGNOSIS — E03.9 HYPOTHYROIDISM, UNSPECIFIED: ICD-10-CM

## 2024-05-07 DIAGNOSIS — G89.29 LOW BACK PAIN, UNSPECIFIED: ICD-10-CM

## 2024-05-07 PROCEDURE — G0439: CPT

## 2024-05-07 PROCEDURE — G0444 DEPRESSION SCREEN ANNUAL: CPT

## 2024-05-08 ENCOUNTER — APPOINTMENT (OUTPATIENT)
Dept: GASTROENTEROLOGY | Facility: GI CENTER | Age: 59
End: 2024-05-08
Payer: MEDICARE

## 2024-05-08 ENCOUNTER — OUTPATIENT (OUTPATIENT)
Dept: OUTPATIENT SERVICES | Facility: HOSPITAL | Age: 59
LOS: 1 days | End: 2024-05-08
Payer: MEDICARE

## 2024-05-08 DIAGNOSIS — G47.33 OBSTRUCTIVE SLEEP APNEA (ADULT) (PEDIATRIC): ICD-10-CM

## 2024-05-08 DIAGNOSIS — C61 MALIGNANT NEOPLASM OF PROSTATE: ICD-10-CM

## 2024-05-08 DIAGNOSIS — Z12.11 ENCOUNTER FOR SCREENING FOR MALIGNANT NEOPLASM OF COLON: ICD-10-CM

## 2024-05-08 DIAGNOSIS — K57.30 DIVERTICULOSIS OF LARGE INTESTINE W/OUT PERFORATION OR ABSCESS W/OUT BLEEDING: ICD-10-CM

## 2024-05-08 DIAGNOSIS — G50.0 TRIGEMINAL NEURALGIA: ICD-10-CM

## 2024-05-08 PROCEDURE — G0121: CPT

## 2024-05-08 PROCEDURE — 45378 DIAGNOSTIC COLONOSCOPY: CPT | Mod: PT

## 2024-05-08 NOTE — PHYSICAL EXAM
[Alert] : alert [Normal Voice/Communication] : normal voice/communication [Healthy Appearing] : healthy appearing [No Acute Distress] : no acute distress [Sclera] : the sclera and conjunctiva were normal [Normal Lips/Gums] : the lips and gums were normal [Hearing Threshold Finger Rub Not [place]] : hearing was normal [Oropharynx] : the oropharynx was normal [Normal Appearance] : the appearance of the neck was normal [No Neck Mass] : no neck mass was observed [No Respiratory Distress] : no respiratory distress [No Acc Muscle Use] : no accessory muscle use [Respiration, Rhythm And Depth] : normal respiratory rhythm and effort [Auscultation Breath Sounds / Voice Sounds] : lungs were clear to auscultation bilaterally [Heart Rate And Rhythm] : heart rate was normal and rhythm regular [Normal S1, S2] : normal S1 and S2 [Murmurs] : no murmurs [Bowel Sounds] : normal bowel sounds [Abdomen Tenderness] : non-tender [No Masses] : no abdominal mass palpated [Abdomen Soft] : soft [] : no hepatosplenomegaly [Oriented To Time, Place, And Person] : oriented to person, place, and time [de-identified] : Large frame and build.

## 2024-05-08 NOTE — HISTORY OF PRESENT ILLNESS
[FreeTextEntry1] : 2/6/2024:   56-year-old -American male with history of hypertension asthma obesity depression and personal history of colon polyps.  Patient has had multiple prior colonoscopies in the past.  Last being 3.5 years ago.  His initial colonoscopy was in 2009 with Dr. Chandler Salgado in [place] and was negative. A subsequent colonoscopy in 2015 revealed an 8 mm polyp.  Pathology not available.  A surveillance colonoscopy done at 2 years in 8/18 by me showed diverticulosis and a tiny rectal polyp which on pathology was hyperplastic and internal hemorrhoids.  Patient had been advised a 5-year interval for next colonoscopy.    He remains asymptomatic from a GI point of view.  He is having 1-2 formed easy bowel movements per day.  No rectal bleeding or abdominal pain or weight loss or alteration in pattern of bowel movements.  No upper GI symptomatology. Review of blood work indicates hemoglobins to run anywhere from high 11's to high 12 range with MCV of about 103.  Prior iron panels and B12 and folate were all normal.  Etiology for is mild anemia, chronic over 4 years is unexplained.  Awaits hematology evaluation. No new medical issues.  5/8/2024:  GI update:   57 yo M c Htn, Asthma/ ALYSIA, BPH and Prostate cancer , s/p cyberknife.  Hypothyroidism.  + PH of colon polyp in 2018 else where.  Asymptomatic.  No new medical issues.  Completed [place].

## 2024-05-15 PROBLEM — J45.909 ASTHMA: Status: ACTIVE | Noted: 2020-08-22

## 2024-05-15 PROBLEM — E03.9 HYPOTHYROID: Status: ACTIVE | Noted: 2019-09-19

## 2024-05-15 PROBLEM — Z86.010 HISTORY OF COLON POLYPS: Status: ACTIVE | Noted: 2018-05-18

## 2024-05-15 PROBLEM — G47.33 OBSTRUCTIVE SLEEP APNEA: Status: ACTIVE | Noted: 2021-06-11

## 2024-05-15 PROBLEM — N40.0 BPH (BENIGN PROSTATIC HYPERPLASIA): Status: ACTIVE | Noted: 2020-08-22

## 2024-05-15 PROBLEM — N18.9 CKD (CHRONIC KIDNEY DISEASE): Status: ACTIVE | Noted: 2021-09-22

## 2024-05-15 PROBLEM — I10 HTN (HYPERTENSION): Status: ACTIVE | Noted: 2019-06-19

## 2024-05-15 NOTE — HISTORY OF PRESENT ILLNESS
[de-identified] : 58M PMH hypothyroidism, thyroid nodules, asthma, BPH, ALYSIA on CPAP, glaucoma, anemia, CKD, depression, HTN, prostate cancer 02/2023 presents for an annual physical exam.  Patient denies any complaints or concerns at this time. He has been previously following with Dr. Sherman and has been on medications for his chronic issues which have been well controlled.   Regarding HTN, he reports taking amlodipine and losartan with controlled pressures. He has been following nephrology for CKD which he reports has been stable. He has seen heme for anemia which has been attributed to chronic disease. Hemoglobin has been stable with no signs and symptoms of bleeding. He has a repeat colo pending tomorrow with a history of small polyps prior.

## 2024-05-15 NOTE — PLAN
[FreeTextEntry1] : 58M PMH hypothyroidism, thyroid nodules, asthma, BPH, ALYSIA on CPAP, glaucoma, anemia, CKD, depression, HTN, prostate cancer 02/2023 presents for an annual physical exam.  # HTN - well-controlled, including at home as per patient - on amlodipine 10mg daily and losartan 100mg daily - cmp 3/2024 with cr 1.58 (CKD) - stable from prior  # CKD - bun/cr 19/ 1.58, GR 50 from 3/2024 - nephro Dr. Carrasco  # depression and anxiety - on buproprion,controlled  # alysia - waiting on new CPAP  # anemia - anemia appears chronic- Hg ~11-12 g/dL going back 2019, likely anemia of chronic disease - noted normal iron levels and elevated b12. - tiny polyps - not cause of anemia as per GI Dr Ontiveros - continue to trend  # hypothyroidism/ thyroid nodules - on levothyroxine 50mcg - last US from 8/2022 showed 2 subcm nodules in L hemithyroid - f/u with endo - TFT checked 11/2023 normal  # asthma: - follows with pulm at [facility] - on wixela BID - controlled  # Prostate cancer, ([id number]) Stage 2 - PSA on 1/20/23 0-3.7 - [facility] - Adenocarcinoma [place] score 3+4 - s/p cyberknife with Dr. Borja (radiation oncology)- total of 5 sessions every other day. (6/5/23-6/14/23) - Continue f/u with Urology and Radiation Oncology.  # HCM: - negative depression screening - last Colonoscopy: 2015- single sessile 8 mm polyp, 2018- polyp - repeat colo tomorrow  # vaccines - covid vaccine done - tdap 2021 - defers shingles and pneumonia vaccine at this time

## 2024-05-15 NOTE — PHYSICAL EXAM
[Normal] : normal rate, regular rhythm, normal S1 and S2 and no murmur heard [Normal Supraclavicular Nodes] : no supraclavicular lymphadenopathy [Normal Anterior Cervical Nodes] : no anterior cervical lymphadenopathy

## 2024-05-15 NOTE — HEALTH RISK ASSESSMENT
[Good] : ~his/her~  mood as  good [Yes] : Yes [2 - 3 times a week (3 pts)] : 2 - 3  times a week (3 points) [1 or 2 (0 pts)] : 1 or 2 (0 points) [Never (0 pts)] : Never (0 points) [No] : In the past 12 months have you used drugs other than those required for medical reasons? No [No falls in past year] : Patient reported no falls in the past year [0] : 2) Feeling down, depressed, or hopeless: Not at all (0) [PHQ-2 Negative - No further assessment needed] : PHQ-2 Negative - No further assessment needed [Audit-CScore] : 3 [[id number]] : 0 [LowDoseCTScan] : NA [HIV test declined] : HIV test declined [Hepatitis C test declined] : Hepatitis C test declined [Change in mental status noted] : No change in mental status noted [Language] : denies difficulty with language [Behavior] : denies difficulty with behavior [Learning/Retaining New Information] : denies difficulty learning/retaining new information [Handling Complex Tasks] : denies difficulty handling complex tasks [Reasoning] : denies difficulty with reasoning [Spatial Ability and Orientation] : denies difficulty with spatial ability and orientation [None] : None [Fully functional (bathing, dressing, toileting, transferring, walking, feeding)] : Fully functional (bathing, dressing, toileting, transferring, walking, feeding) [Fully functional (using the telephone, shopping, preparing meals, housekeeping, doing laundry, using] : Fully functional and needs no help or supervision to perform IADLs (using the telephone, shopping, preparing meals, housekeeping, doing laundry, using transportation, managing medications and managing finances) [Reports changes in hearing] : Reports no changes in hearing [Reports changes in vision] : Reports no changes in vision [Reports normal functional visual acuity (ie: able to read med bottle)] : Reports normal functional visual acuity [Reports changes in dental health] : Reports no changes in dental health [ColonoscopyComments] : pending tomorrow [HIVDate] : 09/21 [HIVComments] :  negative [Never] : Never

## 2024-06-10 ENCOUNTER — OUTPATIENT (OUTPATIENT)
Dept: OUTPATIENT SERVICES | Facility: HOSPITAL | Age: 59
LOS: 1 days | Discharge: HOME | End: 2024-06-10

## 2024-06-10 DIAGNOSIS — D64.9 ANEMIA, UNSPECIFIED: ICD-10-CM

## 2024-06-14 ENCOUNTER — APPOINTMENT (OUTPATIENT)
Dept: HEMATOLOGY ONCOLOGY | Facility: CLINIC | Age: 59
End: 2024-06-14
Payer: MEDICARE

## 2024-06-14 ENCOUNTER — RESULT REVIEW (OUTPATIENT)
Age: 59
End: 2024-06-14

## 2024-06-14 VITALS
HEIGHT: 70 IN | TEMPERATURE: 97.5 F | HEART RATE: 69 BPM | OXYGEN SATURATION: 96 % | SYSTOLIC BLOOD PRESSURE: 129 MMHG | BODY MASS INDEX: 33.7 KG/M2 | DIASTOLIC BLOOD PRESSURE: 82 MMHG | WEIGHT: 235.44 LBS

## 2024-06-14 DIAGNOSIS — D64.9 ANEMIA, UNSPECIFIED: ICD-10-CM

## 2024-06-14 LAB
BASOPHILS # BLD AUTO: 0.1 K/UL — SIGNIFICANT CHANGE UP (ref 0–0.2)
BASOPHILS NFR BLD AUTO: 2.5 % — HIGH (ref 0–2)
EOSINOPHIL # BLD AUTO: 0.2 K/UL — SIGNIFICANT CHANGE UP (ref 0–0.5)
EOSINOPHIL NFR BLD AUTO: 2.7 % — SIGNIFICANT CHANGE UP (ref 0–6)
HCT VFR BLD CALC: 36.8 % — LOW (ref 39–50)
HGB BLD-MCNC: 12.1 G/DL — LOW (ref 13–17)
LYMPHOCYTES # BLD AUTO: 1.5 K/UL — SIGNIFICANT CHANGE UP (ref 1–3.3)
LYMPHOCYTES # BLD AUTO: 27 % — SIGNIFICANT CHANGE UP (ref 13–44)
MCHC RBC-ENTMCNC: 32.7 PG — SIGNIFICANT CHANGE UP (ref 27–34)
MCHC RBC-ENTMCNC: 33 G/DL — SIGNIFICANT CHANGE UP (ref 32–36)
MCV RBC AUTO: 99.2 FL — SIGNIFICANT CHANGE UP (ref 80–100)
MONOCYTES # BLD AUTO: 0.4 K/UL — SIGNIFICANT CHANGE UP (ref 0–0.9)
MONOCYTES NFR BLD AUTO: 7.8 % — SIGNIFICANT CHANGE UP (ref 2–14)
NEUTROPHILS # BLD AUTO: 3.4 K/UL — SIGNIFICANT CHANGE UP (ref 1.8–7.4)
NEUTROPHILS NFR BLD AUTO: 60 % — SIGNIFICANT CHANGE UP (ref 43–77)
PLATELET # BLD AUTO: 229 K/UL — SIGNIFICANT CHANGE UP (ref 150–400)
RBC # BLD: 3.71 M/UL — LOW (ref 4.2–5.8)
RBC # FLD: 11.2 % — SIGNIFICANT CHANGE UP (ref 10.3–14.5)
WBC # BLD: 5.7 K/UL — SIGNIFICANT CHANGE UP (ref 3.8–10.5)
WBC # FLD AUTO: 5.7 K/UL — SIGNIFICANT CHANGE UP (ref 3.8–10.5)

## 2024-06-14 PROCEDURE — 99215 OFFICE O/P EST HI 40 MIN: CPT

## 2024-06-17 PROBLEM — D64.9 ANEMIA, UNSPECIFIED TYPE: Status: ACTIVE | Noted: 2021-09-22

## 2024-06-17 LAB
ALBUMIN SERPL ELPH-MCNC: 4.7 G/DL
ALP BLD-CCNC: 53 U/L
ALT SERPL-CCNC: 14 U/L
ANION GAP SERPL CALC-SCNC: 10 MMOL/L
AST SERPL-CCNC: 18 U/L
BASOPHILS # BLD AUTO: 0.08 K/UL
BASOPHILS NFR BLD AUTO: 1.5 %
BILIRUB SERPL-MCNC: 0.5 MG/DL
BUN SERPL-MCNC: 15 MG/DL
CALCIUM SERPL-MCNC: 9.9 MG/DL
CHLORIDE SERPL-SCNC: 104 MMOL/L
CO2 SERPL-SCNC: 25 MMOL/L
CREAT SERPL-MCNC: 1.45 MG/DL
EGFR: 56 ML/MIN/1.73M2
EOSINOPHIL # BLD AUTO: 0.15 K/UL
EOSINOPHIL NFR BLD AUTO: 2.8 %
FERRITIN SERPL-MCNC: 140 NG/ML
FOLATE SERPL-MCNC: 13 NG/ML
GLUCOSE SERPL-MCNC: 105 MG/DL
HCT VFR BLD CALC: 34.6 %
HGB BLD-MCNC: 11.5 G/DL
IMM GRANULOCYTES NFR BLD AUTO: 0.4 %
IRON SATN MFR SERPL: 29 %
IRON SERPL-MCNC: 101 UG/DL
LYMPHOCYTES # BLD AUTO: 1.58 K/UL
LYMPHOCYTES NFR BLD AUTO: 29.5 %
MAN DIFF?: NORMAL
MCHC RBC-ENTMCNC: 32.5 PG
MCHC RBC-ENTMCNC: 33.2 GM/DL
MCV RBC AUTO: 97.7 FL
MONOCYTES # BLD AUTO: 0.43 K/UL
MONOCYTES NFR BLD AUTO: 8 %
NEUTROPHILS # BLD AUTO: 3.1 K/UL
NEUTROPHILS NFR BLD AUTO: 57.8 %
PLATELET # BLD AUTO: 226 K/UL
POTASSIUM SERPL-SCNC: 4.8 MMOL/L
PROT SERPL-MCNC: 7.4 G/DL
RBC # BLD: 3.54 M/UL
RBC # BLD: 3.54 M/UL
RBC # FLD: 11.9 %
RETICS # AUTO: 2.4 %
RETICS AGGREG/RBC NFR: 86 K/UL
SODIUM SERPL-SCNC: 139 MMOL/L
TIBC SERPL-MCNC: 344 UG/DL
UIBC SERPL-MCNC: 244 UG/DL
VIT B12 SERPL-MCNC: >2000 PG/ML
WBC # FLD AUTO: 5.36 K/UL

## 2024-06-17 NOTE — HISTORY OF PRESENT ILLNESS
[de-identified] : CORWIN BANKS is a 57 year M with PMHX of hypothyroidism, thyroid nodules, asthma, BPH, ALYSIA on CPAP, glaucoma, anemia, CKD, depression, HTN, prostate cancer presents for initial consultation for anemia Patient referred by PCP  PMHx: hypothyroidism, thyroid nodules, asthma, BPH, ALYSIA on CPAP, glaucoma, anemia, CKD, depression, HTN SHx> Biopsy prostate 2/2023- prostate bx was done and showed stage 2 cancer. Family Hx: sister- anemia, mother- anemia Social Hx: smokes cigars   Previous Labs: 6/8/22 WBC 5.71 , Hg 12.2, HCT 37.6 ,  11/10/21 WBC 5.79 , Hg 12.7, HCT 40.4 ,   9/22/21 Ferritin 144 , Fe serum 102 , Saturation 26%, TIBC 398 , B12 686 , Folate >20.       Interval History: Patient presents for follow up  6/16/23 CBC: WBC 4.6 , Hg 12.3 , HCT 36.8 ,  Today's CBC: WBC 6.0, Hg 10.5 , HCT 33.4 , plt 263  Not taking B12 or Iron s/p Right knee arthroscope on 11/14/23 Denies dizziness, SOB, BRBPR, hematuria, chest pain, palor, melena  Last Colonoscopy: 2015- single sessile 8 mm polyp, 2018- polyp, next due 9/2023 Last Endoscopy: None Prior Iron Infusion: None Prior Blood Transfusion: None.  Interval: Hgb stable. Denies GI//mucosal bleeding. No tarry stools. Colonoscopy March 2024, diverticulosis, no path. Feels well. Denies weight loss, lightheadedness, weakness.

## 2024-06-17 NOTE — ASSESSMENT
[FreeTextEntry1] : CORWIN BANKS is a 58 year M with PMHX of hypothyroidism, thyroid nodules, asthma, BPH, ALYSIA on CPAP, glaucoma, anemia, CKD, depression, htn, prostate cancer presents for initial consultation for anemia   #Anemia - anemia appears chronic- Hg ~11-12 g/dL going back 2019 - Likely AOCD i/s/o known renal disease, prostate cancer - Hgb remains above 10 so will hold off on ZACH. Patient following with Nephro - Ordered CBC/CMP/B12/Folate/Iron Profile/EPO/mmunoelectrophoresis - S/p colonoscopy with GI  # Prostate cancer, ([id number] ) Stage 2 - PSA on 1/20/23 0-3.7 - Following [facility] - Adenocarcinoma [place] score 3+4 - s/p cyberknife with Dr. Borja (radiation oncology)- total of 5 sessions every other day. ( 6/5/23-6/14/23) - Continue f/u with Urology and Radiation Oncology.

## 2024-06-18 LAB
ALBUMIN MFR SERPL ELPH: 57.5 %
ALBUMIN SERPL-MCNC: 4.3 G/DL
ALBUMIN/GLOB SERPL: 1.3 RATIO
ALPHA1 GLOB MFR SERPL ELPH: 3.5 %
ALPHA1 GLOB SERPL ELPH-MCNC: 0.3 G/DL
ALPHA2 GLOB MFR SERPL ELPH: 7.9 %
ALPHA2 GLOB SERPL ELPH-MCNC: 0.6 G/DL
B-GLOBULIN MFR SERPL ELPH: 12.9 %
B-GLOBULIN SERPL ELPH-MCNC: 1 G/DL
DEPRECATED KAPPA LC FREE/LAMBDA SER: 1.38 RATIO
EPO SERPL-MCNC: 18.8 MIU/ML
GAMMA GLOB FLD ELPH-MCNC: 1.4 G/DL
GAMMA GLOB MFR SERPL ELPH: 18.2 %
IGA SER QL IEP: 280 MG/DL
IGG SER QL IEP: 1363 MG/DL
IGM SER QL IEP: 69 MG/DL
INTERPRETATION SERPL IEP-IMP: NORMAL
KAPPA LC CSF-MCNC: 1.53 MG/DL
KAPPA LC SERPL-MCNC: 2.11 MG/DL
M PROTEIN SPEC IFE-MCNC: NORMAL
PROT SERPL-MCNC: 7.5 G/DL
PROT SERPL-MCNC: 7.5 G/DL
TESTOST FREE SERPL-MCNC: 5.8 PG/ML
TESTOST SERPL-MCNC: 372 NG/DL

## 2024-07-31 ENCOUNTER — OFFICE (OUTPATIENT)
Dept: URBAN - METROPOLITAN AREA CLINIC 114 | Facility: CLINIC | Age: 59
Setting detail: OPHTHALMOLOGY
End: 2024-07-31
Payer: MEDICARE

## 2024-07-31 DIAGNOSIS — H25.13: ICD-10-CM

## 2024-07-31 DIAGNOSIS — H40.1132: ICD-10-CM

## 2024-07-31 DIAGNOSIS — H16.223: ICD-10-CM

## 2024-07-31 PROCEDURE — 92083 EXTENDED VISUAL FIELD XM: CPT | Performed by: SPECIALIST

## 2024-07-31 PROCEDURE — 99213 OFFICE O/P EST LOW 20 MIN: CPT | Performed by: SPECIALIST

## 2024-07-31 ASSESSMENT — CONFRONTATIONAL VISUAL FIELD TEST (CVF)
OS_FINDINGS: FULL
OD_FINDINGS: FULL

## 2024-08-02 ENCOUNTER — APPOINTMENT (OUTPATIENT)
Dept: INTERNAL MEDICINE | Facility: CLINIC | Age: 59
End: 2024-08-02

## 2024-08-02 VITALS
WEIGHT: 236 LBS | HEIGHT: 70 IN | TEMPERATURE: 97.9 F | BODY MASS INDEX: 33.79 KG/M2 | HEART RATE: 80 BPM | OXYGEN SATURATION: 98 % | SYSTOLIC BLOOD PRESSURE: 140 MMHG | RESPIRATION RATE: 16 BRPM | DIASTOLIC BLOOD PRESSURE: 80 MMHG

## 2024-08-02 DIAGNOSIS — I10 ESSENTIAL (PRIMARY) HYPERTENSION: ICD-10-CM

## 2024-08-02 DIAGNOSIS — D64.9 ANEMIA, UNSPECIFIED: ICD-10-CM

## 2024-08-02 DIAGNOSIS — E03.9 HYPOTHYROIDISM, UNSPECIFIED: ICD-10-CM

## 2024-08-02 DIAGNOSIS — N18.9 CHRONIC KIDNEY DISEASE, UNSPECIFIED: ICD-10-CM

## 2024-08-02 PROCEDURE — G2211 COMPLEX E/M VISIT ADD ON: CPT

## 2024-08-02 PROCEDURE — 99204 OFFICE O/P NEW MOD 45 MIN: CPT

## 2024-08-06 NOTE — HISTORY OF PRESENT ILLNESS
[FreeTextEntry1] : Follow-up / med refills. [de-identified] : 59 yo M presents for follow-up of HTN and hypothyroidism.

## 2024-08-06 NOTE — HISTORY OF PRESENT ILLNESS
[FreeTextEntry1] : Follow-up / med refills. [de-identified] : 57 yo M presents for follow-up of HTN and hypothyroidism.

## 2024-08-06 NOTE — ASSESSMENT
[FreeTextEntry1] : , , HTN: Norvasc Losartan  Hypothyroidism: Rx Synthroid   Anemia: suspected chronic disease morning  reviewed Hematology reports   CKD: reviewed recent labs continued surveillance.

## 2024-12-09 ENCOUNTER — OUTPATIENT (OUTPATIENT)
Dept: OUTPATIENT SERVICES | Facility: HOSPITAL | Age: 59
LOS: 1 days | Discharge: HOME | End: 2024-12-09

## 2024-12-09 DIAGNOSIS — D64.9 ANEMIA, UNSPECIFIED: ICD-10-CM

## 2024-12-13 ENCOUNTER — APPOINTMENT (OUTPATIENT)
Dept: HEMATOLOGY ONCOLOGY | Facility: CLINIC | Age: 59
End: 2024-12-13
Payer: MEDICARE

## 2024-12-13 ENCOUNTER — RESULT REVIEW (OUTPATIENT)
Age: 59
End: 2024-12-13

## 2024-12-13 VITALS
OXYGEN SATURATION: 96 % | HEIGHT: 70 IN | HEART RATE: 83 BPM | TEMPERATURE: 97.9 F | BODY MASS INDEX: 34.46 KG/M2 | SYSTOLIC BLOOD PRESSURE: 148 MMHG | WEIGHT: 240.73 LBS | DIASTOLIC BLOOD PRESSURE: 80 MMHG

## 2024-12-13 DIAGNOSIS — D64.9 ANEMIA, UNSPECIFIED: ICD-10-CM

## 2024-12-13 LAB
BASOPHILS # BLD AUTO: 0.1 K/UL — SIGNIFICANT CHANGE UP (ref 0–0.2)
BASOPHILS NFR BLD AUTO: 1.4 % — SIGNIFICANT CHANGE UP (ref 0–2)
EOSINOPHIL # BLD AUTO: 0.2 K/UL — SIGNIFICANT CHANGE UP (ref 0–0.5)
EOSINOPHIL NFR BLD AUTO: 3.4 % — SIGNIFICANT CHANGE UP (ref 0–6)
HCT VFR BLD CALC: 37 % — LOW (ref 39–50)
HGB BLD-MCNC: 11.7 G/DL — LOW (ref 13–17)
LYMPHOCYTES # BLD AUTO: 1.9 K/UL — SIGNIFICANT CHANGE UP (ref 1–3.3)
LYMPHOCYTES # BLD AUTO: 32.9 % — SIGNIFICANT CHANGE UP (ref 13–44)
MCHC RBC-ENTMCNC: 31.6 G/DL — LOW (ref 32–36)
MCHC RBC-ENTMCNC: 32.4 PG — SIGNIFICANT CHANGE UP (ref 27–34)
MCV RBC AUTO: 102.6 FL — HIGH (ref 80–100)
MONOCYTES # BLD AUTO: 0.3 K/UL — SIGNIFICANT CHANGE UP (ref 0–0.9)
MONOCYTES NFR BLD AUTO: 5.2 % — SIGNIFICANT CHANGE UP (ref 2–14)
NEUTROPHILS # BLD AUTO: 3.3 K/UL — SIGNIFICANT CHANGE UP (ref 1.8–7.4)
NEUTROPHILS NFR BLD AUTO: 57 % — SIGNIFICANT CHANGE UP (ref 43–77)
PLATELET # BLD AUTO: 208 K/UL — SIGNIFICANT CHANGE UP (ref 150–400)
RBC # BLD: 3.61 M/UL — LOW (ref 4.2–5.8)
RBC # FLD: 10.8 % — SIGNIFICANT CHANGE UP (ref 10.3–14.5)
WBC # BLD: 5.8 K/UL — SIGNIFICANT CHANGE UP (ref 3.8–10.5)
WBC # FLD AUTO: 5.8 K/UL — SIGNIFICANT CHANGE UP (ref 3.8–10.5)

## 2024-12-13 PROCEDURE — 99214 OFFICE O/P EST MOD 30 MIN: CPT

## 2024-12-16 LAB
ALBUMIN SERPL ELPH-MCNC: 4.2 G/DL
ALP BLD-CCNC: 50 U/L
ALT SERPL-CCNC: 13 U/L
ANION GAP SERPL CALC-SCNC: 14 MMOL/L
AST SERPL-CCNC: 11 U/L
BILIRUB SERPL-MCNC: 0.4 MG/DL
BUN SERPL-MCNC: 21 MG/DL
CALCIUM SERPL-MCNC: 9.9 MG/DL
CHLORIDE SERPL-SCNC: 102 MMOL/L
CO2 SERPL-SCNC: 25 MMOL/L
CREAT SERPL-MCNC: 1.35 MG/DL
EGFR: 60 ML/MIN/1.73M2
FERRITIN SERPL-MCNC: 165 NG/ML
FOLATE SERPL-MCNC: 11.7 NG/ML
GLUCOSE SERPL-MCNC: 173 MG/DL
IRON SATN MFR SERPL: 39 %
IRON SERPL-MCNC: 125 UG/DL
POTASSIUM SERPL-SCNC: 4.5 MMOL/L
PROT SERPL-MCNC: 7.3 G/DL
SODIUM SERPL-SCNC: 141 MMOL/L
TIBC SERPL-MCNC: 318 UG/DL
UIBC SERPL-MCNC: 193 UG/DL
VIT B12 SERPL-MCNC: >2000 PG/ML

## 2024-12-17 LAB — EPO SERPL-MCNC: 20.1 MIU/ML

## 2024-12-18 LAB
ALBUMIN MFR SERPL ELPH: 58.6 %
ALBUMIN SERPL-MCNC: 4.3 G/DL
ALBUMIN/GLOB SERPL: 1.4 RATIO
ALPHA1 GLOB MFR SERPL ELPH: 3.4 %
ALPHA1 GLOB SERPL ELPH-MCNC: 0.2 G/DL
ALPHA2 GLOB MFR SERPL ELPH: 8.1 %
ALPHA2 GLOB SERPL ELPH-MCNC: 0.6 G/DL
B-GLOBULIN MFR SERPL ELPH: 12.7 %
B-GLOBULIN SERPL ELPH-MCNC: 0.9 G/DL
DEPRECATED KAPPA LC FREE/LAMBDA SER: 1.62 RATIO
GAMMA GLOB FLD ELPH-MCNC: 1.3 G/DL
GAMMA GLOB MFR SERPL ELPH: 17.2 %
IGA SER QL IEP: 286 MG/DL
IGG SER QL IEP: 1322 MG/DL
IGM SER QL IEP: 71 MG/DL
INTERPRETATION SERPL IEP-IMP: NORMAL
KAPPA LC CSF-MCNC: 1.35 MG/DL
KAPPA LC SERPL-MCNC: 2.19 MG/DL
M PROTEIN SPEC IFE-MCNC: NORMAL
PROT SERPL-MCNC: 7.3 G/DL
PROT SERPL-MCNC: 7.3 G/DL

## 2025-04-02 ENCOUNTER — OFFICE (OUTPATIENT)
Dept: URBAN - METROPOLITAN AREA CLINIC 114 | Facility: CLINIC | Age: 60
Setting detail: OPHTHALMOLOGY
End: 2025-04-02
Payer: MEDICARE

## 2025-04-02 DIAGNOSIS — H40.1132: ICD-10-CM

## 2025-04-02 DIAGNOSIS — H25.13: ICD-10-CM

## 2025-04-02 DIAGNOSIS — H16.223: ICD-10-CM

## 2025-04-02 PROCEDURE — 92083 EXTENDED VISUAL FIELD XM: CPT | Performed by: SPECIALIST

## 2025-04-02 PROCEDURE — 99213 OFFICE O/P EST LOW 20 MIN: CPT | Performed by: SPECIALIST

## 2025-04-02 ASSESSMENT — SUPERFICIAL PUNCTATE KERATITIS (SPK)
OD_SPK: T
OS_SPK: T

## 2025-04-02 ASSESSMENT — VISUAL ACUITY
OD_BCVA: 20/20
OS_BCVA: 20/25+

## 2025-04-02 ASSESSMENT — CONFRONTATIONAL VISUAL FIELD TEST (CVF)
OD_FINDINGS: FULL
OS_FINDINGS: FULL

## 2025-04-02 ASSESSMENT — PACHYMETRY
OS_CT_UM: 545
OD_CT_CORRECTION: -1
OS_CT_CORRECTION: 0
OD_CT_UM: 568

## 2025-04-02 ASSESSMENT — TONOMETRY
OS_IOP_MMHG: 16
OD_IOP_MMHG: 17

## 2025-05-09 ENCOUNTER — APPOINTMENT (OUTPATIENT)
Dept: INTERNAL MEDICINE | Facility: CLINIC | Age: 60
End: 2025-05-09

## 2025-05-09 VITALS
TEMPERATURE: 97.5 F | WEIGHT: 244 LBS | HEIGHT: 70 IN | BODY MASS INDEX: 34.93 KG/M2 | OXYGEN SATURATION: 97 % | DIASTOLIC BLOOD PRESSURE: 84 MMHG | HEART RATE: 84 BPM | SYSTOLIC BLOOD PRESSURE: 134 MMHG | RESPIRATION RATE: 15 BRPM

## 2025-05-09 DIAGNOSIS — D64.9 ANEMIA, UNSPECIFIED: ICD-10-CM

## 2025-05-09 DIAGNOSIS — E03.9 HYPOTHYROIDISM, UNSPECIFIED: ICD-10-CM

## 2025-05-09 DIAGNOSIS — I10 ESSENTIAL (PRIMARY) HYPERTENSION: ICD-10-CM

## 2025-05-09 DIAGNOSIS — N18.9 CHRONIC KIDNEY DISEASE, UNSPECIFIED: ICD-10-CM

## 2025-05-09 DIAGNOSIS — Z00.00 ENCOUNTER FOR GENERAL ADULT MEDICAL EXAMINATION W/OUT ABNORMAL FINDINGS: ICD-10-CM

## 2025-05-09 LAB
ALBUMIN SERPL ELPH-MCNC: 4.3 G/DL
ALP BLD-CCNC: 50 U/L
ALT SERPL-CCNC: 14 U/L
ANION GAP SERPL CALC-SCNC: 14 MMOL/L
APPEARANCE: CLEAR
AST SERPL-CCNC: 19 U/L
BASOPHILS # BLD AUTO: 0.07 K/UL
BASOPHILS NFR BLD AUTO: 1.3 %
BILIRUB SERPL-MCNC: 0.5 MG/DL
BILIRUBIN URINE: NEGATIVE
BLOOD URINE: NEGATIVE
BUN SERPL-MCNC: 19 MG/DL
CALCIUM SERPL-MCNC: 10.1 MG/DL
CHLORIDE SERPL-SCNC: 104 MMOL/L
CHOLEST SERPL-MCNC: 160 MG/DL
CO2 SERPL-SCNC: 23 MMOL/L
COLOR: YELLOW
CREAT SERPL-MCNC: 1.43 MG/DL
EGFRCR SERPLBLD CKD-EPI 2021: 56 ML/MIN/1.73M2
EOSINOPHIL # BLD AUTO: 0.26 K/UL
EOSINOPHIL NFR BLD AUTO: 5 %
ESTIMATED AVERAGE GLUCOSE: 108 MG/DL
GLUCOSE QUALITATIVE U: NEGATIVE MG/DL
GLUCOSE SERPL-MCNC: 158 MG/DL
HBA1C MFR BLD HPLC: 5.4 %
HCT VFR BLD CALC: 37 %
HDLC SERPL-MCNC: 54 MG/DL
HGB BLD-MCNC: 11.7 G/DL
IMM GRANULOCYTES NFR BLD AUTO: 0.4 %
KETONES URINE: NEGATIVE MG/DL
LDLC SERPL-MCNC: 93 MG/DL
LEUKOCYTE ESTERASE URINE: NEGATIVE
LYMPHOCYTES # BLD AUTO: 1.66 K/UL
LYMPHOCYTES NFR BLD AUTO: 31.9 %
MAN DIFF?: NORMAL
MCHC RBC-ENTMCNC: 31.6 G/DL
MCHC RBC-ENTMCNC: 32.1 PG
MCV RBC AUTO: 101.4 FL
MONOCYTES # BLD AUTO: 0.5 K/UL
MONOCYTES NFR BLD AUTO: 9.6 %
NEUTROPHILS # BLD AUTO: 2.69 K/UL
NEUTROPHILS NFR BLD AUTO: 51.8 %
NITRITE URINE: NEGATIVE
NONHDLC SERPL-MCNC: 105 MG/DL
PH URINE: 6
PLATELET # BLD AUTO: 261 K/UL
POTASSIUM SERPL-SCNC: 4.7 MMOL/L
PROT SERPL-MCNC: 7.1 G/DL
PROTEIN URINE: NEGATIVE MG/DL
PSA FREE FLD-MCNC: NORMAL %
PSA FREE SERPL-MCNC: <0.01 NG/ML
PSA SERPL-MCNC: 0.24 NG/ML
RBC # BLD: 3.65 M/UL
RBC # FLD: 12.2 %
SODIUM SERPL-SCNC: 141 MMOL/L
SPECIFIC GRAVITY URINE: 1.02
TRIGL SERPL-MCNC: 63 MG/DL
TSH SERPL-ACNC: 1.02 UIU/ML
UROBILINOGEN URINE: 0.2 MG/DL
WBC # FLD AUTO: 5.2 K/UL

## 2025-05-09 PROCEDURE — 93000 ELECTROCARDIOGRAM COMPLETE: CPT

## 2025-05-09 PROCEDURE — G0439: CPT

## 2025-05-09 PROCEDURE — 99214 OFFICE O/P EST MOD 30 MIN: CPT | Mod: 25

## 2025-08-13 ENCOUNTER — OFFICE (OUTPATIENT)
Dept: URBAN - METROPOLITAN AREA CLINIC 114 | Facility: CLINIC | Age: 60
Setting detail: OPHTHALMOLOGY
End: 2025-08-13
Payer: MEDICARE

## 2025-08-13 DIAGNOSIS — H40.1132: ICD-10-CM

## 2025-08-13 DIAGNOSIS — H16.223: ICD-10-CM

## 2025-08-13 DIAGNOSIS — H25.13: ICD-10-CM

## 2025-08-13 PROCEDURE — 92014 COMPRE OPH EXAM EST PT 1/>: CPT | Performed by: SPECIALIST

## 2025-08-13 PROCEDURE — 92133 CPTRZD OPH DX IMG PST SGM ON: CPT | Performed by: SPECIALIST

## 2025-08-13 ASSESSMENT — PACHYMETRY
OS_CT_UM: 545
OD_CT_CORRECTION: -1
OD_CT_UM: 568
OS_CT_CORRECTION: 0

## 2025-08-13 ASSESSMENT — TONOMETRY
OS_IOP_MMHG: 17
OD_IOP_MMHG: 17

## 2025-08-13 ASSESSMENT — VISUAL ACUITY
OD_BCVA: 20/25
OS_BCVA: 20/25

## 2025-08-13 ASSESSMENT — SUPERFICIAL PUNCTATE KERATITIS (SPK)
OS_SPK: T
OD_SPK: T

## 2025-08-13 ASSESSMENT — CONFRONTATIONAL VISUAL FIELD TEST (CVF)
OD_FINDINGS: FULL
OS_FINDINGS: FULL

## (undated) DEVICE — KIT VALVE DEFENDO